# Patient Record
Sex: FEMALE | Race: WHITE
[De-identification: names, ages, dates, MRNs, and addresses within clinical notes are randomized per-mention and may not be internally consistent; named-entity substitution may affect disease eponyms.]

---

## 2019-07-09 NOTE — HP
*** AMENDED REPORT NOW INCLUDES DESIGNATED COSIGNER ***



HISTORY AND PHYSICAL:

 

DATE OF ADMISSION/SURGERY:  07/16/19

 

DATE OF OFFICE VISIT:  07/08/19

 

SURGEON:  Eve Gifford MD * (DICTATED BY YARITZA MURILLO)

 

PROCEDURE:  Left total knee arthroplasty.

 

CHIEF COMPLAINT:  Left knee pain.

 

HISTORY OF PRESENT ILLNESS:  Ms. Garnett is a 68-year-old female with end-stage 
osteoarthritis of the left knee.  She has failed conservative treatment and 
elected to proceed with the left total knee arthroplasty.

 

PAST MEDICAL HISTORY:  Hypertension, high cholesterol, sleep apnea, 
hypothyroidism.

 

PAST SURGICAL HISTORY:  Vein stripping, hysterectomy, partial thyroidectomy, D 
and C, and sclerotherapy.

 

CURRENT MEDICATIONS:

1.  Tylenol as needed.

2.  Amlodipine 10 mg daily.

3.  Atorvastatin 20 mg daily.

4.  Biotin 5 mg daily.

5.  Chlorthalidone 25 mg daily.

6.  Loratadine 10 mg daily.

7.  Losartan 100 mg daily.

8.  Metronidazole topical cream as needed.

9.  Mometasone nasal spray at night.

10.  Multivitamin daily.

11.  Potassium daily.

12.  Proctosol as needed.

13.  Senna as needed.

14.  Turmeric.

15.  VESIcare.

16.  CPAP machine.

 

ALLERGIES:  No known drug allergies.

 

FAMILY HISTORY:  Cancer and coronary artery disease.

 

SOCIAL HISTORY:  She is a 68-year-old female.  She lives alone.  She does not 
smoke or use drugs.  She uses alcohol rarely.

 

REVIEW OF SYSTEMS:  A complete 14-point review of systems was reviewed with the 
patient.  It was positive for hypothyroidism.  She denies history of DVT, PE, 
hepatitis, HIV, or anesthesia problems.

 

                               PHYSICAL EXAMINATION

 

GENERAL:  She is well developed, well nourished, in no acute distress.

 

VITAL SIGNS:  She stands 5 feet 6 inches tall, weighs 259 pounds.  Her blood 
pressure is 118/64, heart rate is 80.

 

HEENT:  Normocephalic, atraumatic.

 

NECK:  Supple.  No palpable lymph nodes.

 

PULMONARY:  Lungs are clear to auscultation bilaterally.

 

CARDIO:  Regular rate and rhythm.  Strong S1, S2.

 

ABDOMEN:  Soft, nontender, and nondistended.

 

NEUROLOGICAL:  She is alert and oriented x3.

 

MUSCULOSKELETAL:  Left lower extremity:  The skin is intact.  There are no open 
wounds or abrasions.  There is a moderate to large effusion of the left knee. 
There is a varus deformity.  Range of motion is 10 to 120 degrees of flexion. 
Positive Apley's.  Positive Ari's.  Significant patellofemoral crepitus 
with range of motion.  She is able to dorsiflex and plantar flex and has a 2+ 
dorsalis pedis pulse.

 

ASSESSMENT AND PLAN:  Ms. Garnett is a 68-year-old female with end-stage 
osteoarthritis of the left knee.  She has failed conservative treatment and 
elected to proceed with a left total knee arthroplasty.  The surgery is 
scheduled for 

07/16/19 with Dr. Gifford. Dr. Gifford discussed the risks and benefits of the 
surgery at today's visit and all of her questions were answered.  She will 
follow up with Dr. Gifford 2 weeks after the surgery.

 

____________________________________ YARITZA MURILLO

 

[id number]/[facility] #: [id number]

YURIY

## 2019-07-16 ENCOUNTER — HOSPITAL ENCOUNTER (INPATIENT)
Dept: HOSPITAL 25 - AA | Age: 69
LOS: 2 days | Discharge: NURSING, REHAB OR LONG-TERM CARE | DRG: 470 | End: 2019-07-18
Attending: ORTHOPAEDIC SURGERY | Admitting: ORTHOPAEDIC SURGERY
Payer: MEDICARE

## 2019-07-16 DIAGNOSIS — G47.33: ICD-10-CM

## 2019-07-16 DIAGNOSIS — E87.6: ICD-10-CM

## 2019-07-16 DIAGNOSIS — I11.9: ICD-10-CM

## 2019-07-16 DIAGNOSIS — Z72.89: ICD-10-CM

## 2019-07-16 DIAGNOSIS — R11.0: ICD-10-CM

## 2019-07-16 DIAGNOSIS — I44.7: ICD-10-CM

## 2019-07-16 DIAGNOSIS — K57.50: ICD-10-CM

## 2019-07-16 DIAGNOSIS — E66.9: ICD-10-CM

## 2019-07-16 DIAGNOSIS — M21.162: ICD-10-CM

## 2019-07-16 DIAGNOSIS — Z87.891: ICD-10-CM

## 2019-07-16 DIAGNOSIS — I49.3: ICD-10-CM

## 2019-07-16 DIAGNOSIS — E78.5: ICD-10-CM

## 2019-07-16 DIAGNOSIS — Z80.1: ICD-10-CM

## 2019-07-16 DIAGNOSIS — M17.0: Primary | ICD-10-CM

## 2019-07-16 DIAGNOSIS — Z83.49: ICD-10-CM

## 2019-07-16 DIAGNOSIS — E78.00: ICD-10-CM

## 2019-07-16 DIAGNOSIS — E89.0: ICD-10-CM

## 2019-07-16 DIAGNOSIS — L84: ICD-10-CM

## 2019-07-16 DIAGNOSIS — Z90.710: ICD-10-CM

## 2019-07-16 DIAGNOSIS — M25.462: ICD-10-CM

## 2019-07-16 DIAGNOSIS — M25.762: ICD-10-CM

## 2019-07-16 PROCEDURE — 36415 COLL VENOUS BLD VENIPUNCTURE: CPT

## 2019-07-16 PROCEDURE — 85049 AUTOMATED PLATELET COUNT: CPT

## 2019-07-16 PROCEDURE — C1776 JOINT DEVICE (IMPLANTABLE): HCPCS

## 2019-07-16 PROCEDURE — 0SRD0J9 REPLACEMENT OF LEFT KNEE JOINT WITH SYNTHETIC SUBSTITUTE, CEMENTED, OPEN APPROACH: ICD-10-PCS | Performed by: ORTHOPAEDIC SURGERY

## 2019-07-16 PROCEDURE — 80048 BASIC METABOLIC PNL TOTAL CA: CPT

## 2019-07-16 PROCEDURE — 85014 HEMATOCRIT: CPT

## 2019-07-16 PROCEDURE — 85018 HEMOGLOBIN: CPT

## 2019-07-16 PROCEDURE — 88305 TISSUE EXAM BY PATHOLOGIST: CPT

## 2019-07-16 PROCEDURE — 83735 ASSAY OF MAGNESIUM: CPT

## 2019-07-16 PROCEDURE — 88311 DECALCIFY TISSUE: CPT

## 2019-07-16 RX ADMIN — SODIUM CHLORIDE, SODIUM LACTATE, POTASSIUM CHLORIDE, AND CALCIUM CHLORIDE SCH MLS/HR: 600; 310; 30; 20 INJECTION, SOLUTION INTRAVENOUS at 16:23

## 2019-07-16 RX ADMIN — FLUTICASONE PROPIONATE SCH SPRAY: 50 SPRAY, METERED NASAL at 22:36

## 2019-07-16 RX ADMIN — OXYCODONE HYDROCHLORIDE PRN MG: 5 CAPSULE ORAL at 18:23

## 2019-07-16 RX ADMIN — OXYCODONE HYDROCHLORIDE PRN MG: 5 CAPSULE ORAL at 22:36

## 2019-07-16 RX ADMIN — MAGNESIUM HYDROXIDE SCH ML: 400 SUSPENSION ORAL at 22:36

## 2019-07-16 RX ADMIN — OXYCODONE HYDROCHLORIDE AND ACETAMINOPHEN PRN TAB: 5; 325 TABLET ORAL at 16:41

## 2019-07-16 RX ADMIN — CEFAZOLIN SCH MLS/HR: 1 INJECTION, POWDER, FOR SOLUTION INTRAVENOUS at 20:14

## 2019-07-16 RX ADMIN — DOCUSATE SODIUM SCH MG: 100 CAPSULE, LIQUID FILLED ORAL at 22:36

## 2019-07-16 NOTE — XMS REPORT
Continuity of Care Document (CCD)

 Created on:2019



Patient:Caryn Garcia

Sex:Female

:1950

External Reference #:[id number]





Demographics







 Address  [address] [place] [address]

 

 Home Phone  [phone]

 

 Mobile Phone  [phone]

 

 Email Address  [email or web address]

 

 Preferred Language  en

 

 Marital Status  Not  or 

 

 [employer] Affiliation  Unknown

 

 Race  White

 

 Ethnic Group  Not  or 









Author







 Name  Luma Garcia









Support







 Name  Relationship  Address  Phone

 

 Dorothy Richards  Unavailable  Unavailable  [phone]









Care Team Providers







 Name  Role  Phone

 

 Josesito Rubin DC  Care Team Information   Unavailable

 

 Juanita Smith MD  Primary Care Physician  Unavailable









Payers







 Date  Identification Numbers  Payment Provider  Subscriber

 

 Effective: 2014  Policy Number: [id number]  Medicare  Caryn Garcia









 PayID: [id number]  [address]









 [place] [address]









   Policy Number: [id number]  [facility]  Caryn Garcia









 Group Number: [id number]  [address] 

 

 PayID: [id number]  [place] [address]









 Effective: 2014  Policy Number: [id number]  [facility]  Chinmay Garcia 
III









 Expires: 2018  PayID: [id number]  [address] [address]









 [place] [address]







Problems







 Active Problems  Provider  Date

 

 Obstructive sleep apnea syndrome  Grace Johnston DNP, RN, FNP-BC  Onset: 2011

 

 Localized, primary osteoarthritis  Eve Gifford M.D.  Onset: 2017

 

 Heart sounds abnormal  Grace Johnston DNP, RN, FNP-BC  Onset: 2018







Family History







 Date  Family Member(s)  Observation  Comments

 

   General  Cancer  







Social History







 Type  Date  Description  Comments

 

 Birth Sex    Unknown  

 

 Lives With      

 

 Occupation    Retired  

 

 ETOH Use    Occasionally consumes alcohol  

 

 Tobacco Use  Start: Unknown  Patient has never smoked  

 

 Smoking Status  Reviewed: 19  Patient has never smoked  

 

 Exercise Type/Frequency    Exercises regularly  







Allergies, Adverse Reactions, Alerts







 Description

 

 No Known Drug Allergies







Medications







 Active Medications  SIG  Qnty  Indications  Ordering  Date



         Provider  

 

 Cephalexin  1 tablet every 8  30caps    Eve Gifford,  2018



        500mg Capsules  hours x 10 days      M.D.  



           

 

 Metoprolol Succinate ER  one by mouth      Unknown  



   daily        



 50mg          

 

 Chlorthalidone  one by mouth      Unknown  



            25mg  daily        



           

 

 Vesicare  one by mouth      Unknown  



      10mg  daily        



           

 

 Atorvastatin Calcium  one by mouth      Unknown  



                  20mg  daily        



           

 

 Losartan Potassium  one by mouth      Unknown  



                100mg  daily        



           

 

 Nasonex  as needed      Unknown  



     50mcg          



           

 

 Proctosol HC  as needed      Unknown  



          2.5%          



           

 

 Omega 3 500        Unknown  



         500mg Capsules          



           

 

 Vitamin D  by mouth      Unknown  



       1000Unit Tablets  everyday        



           

 

 Biotin  take one      Unknown  



   capsule/tablet        



   daily by mouth        

 

 Potassium  1 by mouth every      Unknown  



       75mg Tablets  day        



           

 

 Atorvastatin Calcium  Take 1 Tablet By      Unknown  



                  20mg  Mouth Every Day        



 Tablets          



           







Medications Administered in Office







 Medication  SIG  Qnty  Indications  Ordering Provider  Date

 

 Depomedrol 40MG        Eve Gifford M.D.  2018



         Injection          



           

 

 Depomedrol 40MG        Eve Gifford M.D.  05/15/2017



         Injection          



           

 

 Technetium TC 99M        [facility]  05/15/2017



 Tetrofosmin, Per Unit Dose          



 Up To 40 Millicuries          



              Injection          



           

 

 Inj, Regadenoson, 0.1 MG        Red Santa M.D.,  2017



                  Injection        FACC, FASNC  



           

 

 Technetium TC 99M        Red Santa M.D.,  2017



 Tetrofosmin, Per Unit Dose        FACC, FASNC  



 Up To 40 Millicuries          



              Injection          



           







Vital Signs







 Date  Vital  Result  Comment

 

 2019 10:11am  Height  66 inches  5'6"









 Weight  260.00 lb  

 

 Heart Rate  81 /min  

 

 O2 % BldC Oximetry  97 %  

 

 BMI (Body Mass Index)  42.0 kg/m2  









 2018  2:28pm  Height  66 inches  5'6"









 Weight  252.00 lb  

 

 BP Systolic  126 mmHg  

 

 BP Diastolic  71 mmHg  

 

 Respiratory Rate  16 /min  

 

 Pain Level  2  

 

 BMI (Body Mass Index)  40.7 kg/m2  









 2018  1:09pm  Height  66 inches  5'6"









 Weight  252.00 lb  

 

 Heart Rate  60 /min  

 

 BP Systolic Sitting  138 mmHg  

 

 BP Diastolic Sitting  82 mmHg  

 

 Respiratory Rate  14 /min  

 

 O2 % BldC Oximetry  97 %  

 

 BMI (Body Mass Index)  40.7 kg/m2  









 2017  9:44am  Height  66 inches  5'6"









 Weight  256.00 lb  

 

 Heart Rate  70 /min  

 

 BP Systolic Sitting  138 mmHg  

 

 BP Diastolic Sitting  74 mmHg  

 

 Respiratory Rate  28 /min  

 

 O2 % BldC Oximetry  96 %  room air

 

 BMI (Body Mass Index)  41.3 kg/m2  









 05/15/2017 10:42am  Height  66 inches  5'6"









 Weight  255.00 lb  

 

 Heart Rate  76 /min  

 

 BP Systolic  154 mmHg  

 

 BP Diastolic  71 mmHg  

 

 Pain Level  2  

 

 BMI (Body Mass Index)  41.2 kg/m2  









 2017 10:15am  Height  66 inches  5'6"









 Weight  255.00 lb  

 

 Heart Rate  90 /min  

 

 BP Systolic  170 mmHg  

 

 BP Diastolic  80 mmHg  

 

 Body Temperature  97.3 F  

 

 BMI (Body Mass Index)  41.2 kg/m2  









 2016 11:38am  Height  66 inches  5'6"









 Weight  245.00 lb  

 

 Heart Rate  82 /min  

 

 BP Systolic  148 mmHg  

 

 BP Diastolic  70 mmHg  

 

 Respiratory Rate  14 /min  

 

 O2 % BldC Oximetry  96 %  

 

 BMI (Body Mass Index)  39.5 kg/m2  









 2014  3:13pm  Height  66 inches  5'6"









 Weight  260.00 lb  

 

 Heart Rate  77 /min  

 

 BP Systolic Sitting  140 mmHg  

 

 BP Diastolic Sitting  66 mmHg  

 

 Respiratory Rate  18 /min  

 

 O2 % BldC Oximetry  97 %  

 

 BMI (Body Mass Index)  42.0 kg/m2  









 2014 10:38am  Height  66 inches  5'6"









 Weight  252.00 lb  

 

 Heart Rate  78 /min  

 

 BP Systolic  164 mmHg  

 

 BP Diastolic  98 mmHg  

 

 BMI (Body Mass Index)  40.7 kg/m2  







Results







 Test  Date  Facility  Test  Result  H/L  Range  Note

 

 Laboratory test  2017  [facility]  Cytology  SEE RESULT      1



 finding    [address]  Non-Gyn  BELOW      



     [place] [id number] [phone]          









 1  SEE RESULT BELOW



   -----------------------------------------------------------------------------
---------------



   Name:  CARYN GARCIA                  : 1950    Attend Dr: Richi Valencia MD



   Acct:  [id number]  Unit: [id number]  AGE: 67            Location:  THYROID



   Re17                        SEX: F             Status:    REG REF



   -----------------------------------------------------------------------------
---------------



   



   SPEC: [id number]            LORI: 17-      SUBM DR: Richi Valencia MD



   REQ:  [id number]             RECD: 17-



   STATUS: AZRA HARKINS DR: Juanita Goodrich MD



   _



   ORDERED:  FNA-IMG GUID BX, CYTO ADEQ-1ST P



   



   FINAL DIAGNOSIS



   



   



   



   Thyroid, left lower, Ultrasound guided, fine needle



   aspiration:



   --Benign thyroid nodule- involutional type (Arden Class



   II).



   



   



   



   



   



   



   The specimen demonstrates moderate watery proteinaceous fluid,



   a moderate amount of benign appearing follicular epithelium



   arranged in uniform sheets, medium sized follicles and only



   occasional small groups.  Abundant pigmented and non-pigmented



   macrophages are seen in the background.  No features of



   papillary carcinoma are seen.  In this clinical setting the



   risk of malignancy is less than 3%.  Clinical management of



   this thyroid nodule should be based on clinical and



   radiographic features as well as the above findings.



   THYROID LEFT - US GUIDED FINE NEEDLE ASPIRATION LEFT LOWER THYROID NODULE



   



   CLINICAL HISTORY



   



   Left lower thyroid nodule 5.3 x 3.9 x 4.4cm



   



   IMMEDIATE INTERPRETATION



   



   Pass 1-adequate



   



   



   



   



   ** CONTINUED ON NEXT PAGE **



   



   * ML=Testing performed at Main Lab



   DEPARTMENT OF PATHOLOGY,  [address] [place] [address]



   Phone # [phone]      Fax #[phone]



   Olu Quigley M.D. Director     [place] # [id number]



   



   



   



   RUN DATE: 17               [facility] LAB **LIVE**         
       PAGE    2



   



   -----------------------------------------------------------------------------
---------------



   Patient: CARYN GARCIA                   [id number]     (Continued)



   -----------------------------------------------------------------------------
---------------



   



   GROSS DESCRIPTION          (Continued)



   



   



   GROSS DESCRIPTION



   



   2- alcohol fixed slide(s)



   1- passes



   



   Signed __________(signature on file)___________ Olu Quigley MD  [id number]



   



   -----------------------------------------------------------------------------
---------------



   



   



   



   



   



   



   



   



   



   



   



   



   



   



   



   



   



   



   



   



   



   



   



   



   



   



   



   



   



   



   



   



   



   



   ** END OF REPORT **



   



   * ML=Testing performed at Main Lab



   DEPARTMENT OF PATHOLOGY,  [address] [place] [address]



   Phone # [phone]      Fax #[phone]



   Olu Quigley M.D. Director     [facility] # [id number]







Procedures







 Date  Code  Description  Status

 

 2018  Inject/Drain Joint/Bursa Major W/O US  Completed

 

 05/15/2017  47013  Inject/Drain Joint/Bursa Major W/O US  Completed

 

 2017  53270  Stress Test  Completed

 

 2017  12401  Myocardial Perfusion Imaging Tomographic (Spect) Multiple  
Completed



     Studies  







Encounters







 Type  Date  Location  Provider  Dx  Diagnosis

 

 Office Visit  2019  Orthopedic  Eve Gifford,  M25.562  Pain in left knee



   9:45a  [facility]    









 M25.462  Effusion, left knee

 

 M17.12  Unilateral primary osteoarthritis, left knee









 Office Visit  2018  2:15p  Orthopedic Services  Eve Gifford,  M25.562  
Pain in left



     Of [facility]  MKleberD.    knee









 M25.462  Effusion, left knee

 

 M17.12  Unilateral primary osteoarthritis, left knee









 Office Visit  2018  Pulmonology And  Grace  G47.33  Obstructive sleep



   1:15p  Sleep [facility]  GAGE Johnston RN,    apnea (adult)



     Issa VARGHESE-BC    (pediatric)









 R00.8  Other abnormalities of heart beat









 Office Visit  2017  Pulmonology And  Grace  G47.33  Obstructive sleep



   9:30a  [facility]  GAGE Johnston RN,    apnea (adult)



     Issa VARGHESE-BC    (pediatric)

 

 Office Visit  2017  Orthopedic  Eve Gifford,  M25.462  Effusion, left



   10:00a  Services Of  M.D.    knee



     [facility]      









 M25.562  Pain in left knee

 

 M17.12  Unilateral primary osteoarthritis, left knee









 Office Visit  2016  Pulmonology And  Grace  G47.33  Obstructive sleep



   11:15a  [facility]  GAGE Johnston    apnea (adult)



     ABIMAEL Parsons RN-BC    (pediatric)

 

 Office Visit  2014  Pulmonology And  Grace  327.23  Obstructive Sleep



   3:00p  [facility]  GAGE Johnston    Apnea Adult &



     Issa MERCHANT, FNP-BC    Pediatric

 

 Office Visit  2014  Orthopedic  Eleno  715.36  Osteoarthrosis



   10:00a  Services Of  TOM Palencia Not Spec



     [facility]      Prime Or 2Ndy Lower



           Leg







Plan of Treatment

Future Appointment(s):2020  9:20 am - Perfecto Hendrix MD at [facility] 
Nvdjchgvtlv96/08/2019  9:30 am - Eve Gifford M.D. at [facility]2019  9:30 am - Eve Gifford M.D. at [facility]2019 - Eve Gifford M.D.M25.562 Pain in left kneeFollow up:Follow 
up:   7-10 days before mvlyqyjO56.462 Effusion, left kneeM17.12 Unilateral 
primary osteoarthritis, left knee

## 2019-07-16 NOTE — XMS REPORT
Continuity of Care Document (CCD)

 Created on:2019



Patient:Caryn Garcia

Sex:Female

:1950

External Reference #:[id number]





Demographics







 Address  [address] [place] [address]

 

 Home Phone  [phone]

 

 Mobile Phone  [phone]

 

 Email Address  [email or web address]

 

 Preferred Language  en

 

 Marital Status  Not  or 

 

 Cheondoism Affiliation  Unknown

 

 Race  White

 

 Ethnic Group  Not  or 









Author







 Name  Luzmaria Rodriguez









Support







 Name  Relationship  Address  Phone

 

 Dorothy Richards  Unavailable  Unavailable  [phone]









Care Team Providers







 Name  Role  Phone

 

 Josesito Rubin DC  Care Team Information   Unavailable

 

 Juanita Smith MD  Primary Care Physician  Unavailable









Payers







 Date  Identification Numbers  Payment Provider  Subscriber

 

 Effective: 2014  Policy Number: [id number]  Medicare  Caryn Garcia









 PayID: [id number]  [address]









 [place] [address]









   Policy Number: [id number]  [facility]  Caryn Garcia









 Group Number: [id number]  [address] 

 

 PayID: [id number]  [place] [address]









 Effective: 2014  Policy Number: [id number]   Facets  Chinmay Garcia 
III









 Expires: 2018  PayID: [id number]  [address] [address]









 [place] [address]







Problems







 Active Problems  Provider  Date

 

 Obstructive sleep apnea syndrome  Grace Johnston DNP, RN, FNP-BC  Onset: 2011

 

 Localized, primary osteoarthritis  Eve Gifford M.D.  Onset: 2017

 

 Heart sounds abnormal  Grace Johnston DNP, RN, FNP-BC  Onset: 2018







Family History







 Date  Family Member(s)  Observation  Comments

 

   General  Cancer  

 

   General  Hypertension  

 

 Onset:  (age  Father  Lung Cancer  HEAVY SMOKER 



 72 Years)      ASTHMA ALLERGIES ECZEMA

 

   Mother  Hypertension  CAD  MOTHER  AGE 93



       CHF

 

 Onset:  (age  Mother  Congestive Heart Failure (CHF)  



 93 Years)      

 

 Onset:  (age  Mother  Thyroid Disease  THYROID CANCER



 84 Years)      

 

   Mother  Hypercholesterolemia  HYPERLIPIDEMIA

 

   Siblings  1  Sister - congenital



       heart defect hole in



       heart







Social History







 Type  Date  Description  Comments

 

 Birth Sex    Unknown  

 

 Marital Status      

 

 Lives With    Alone  

 

 Occupation    Retired  

 

 ETOH Use    Occasionally consumes  1 glass of good red



     alcohol  wine

 

 Tobacco Use  Start: Unknown  Patient has never  



     smoked  

 

 Recreational Drug Use    Denies Drug Use  

 

 Smoking Status  Reviewed: 19  Patient has never  



     smoked  

 

 Exercise Type/Frequency    Exercises regularly  physical therapy desk



       stationary bike goes



       to [email or web address]







Allergies, Adverse Reactions, Alerts







 Description

 

 No Known Drug Allergies







Medications







 Active Medications  SIG  Qnty  Indications  Ordering  Date



         Provider  

 

 Amlodipine Besylate  1 by mouth every  30tabs  I10  Amado MA  2019



                   5mg  day      Choudhury, DO FACC  



 Tablets          



           

 

 Metoprolol Succinate  1 by mouth every  7tabs    Amado S.  2019



 ER  day      Choudhury, DO FACC  



  25mg Tablets ER 24HR          



           

 

 Tumeric  99 mg  1 tab po qd      Unknown  

 

 Loratadine  once a day for      Unknown  



          10mg  allergies as needed        



 Capsules          



           

 

 Acetaminophen  2 every 6 hours as      Unknown  



             500mg  needed        



 Tablets          



           

 

 Mometasone Furoate  spray two sprays in      Unknown  



   each nostril twice        



 50mcg/Act Suspension  daily        



           

 

 Metrocream  apply twice a day -      Unknown  



          0.75% Cream  thin film as needed        



           

 

 Senna S  take 2 t d at hs      Unknown  



       8.6-50mg          



 Tablets          



           

 

 Cpap  for use while      Unknown  



     Device  sleeping 10 cm        



           

 

 Ec-81 Aspirin  take 1 t po d last      Unknown  



             81mg  took in feb because        



 Tablets DR  of hysterectomy        



   never told to        



   resume        

 

 Atorvastatin Calcium  Take 1 Tablet By      Unknown  



   Mouth Every Day        



 20mg Tablets          



           

 

 Potassium  1 by mouth every      Unknown  



         75mg Tablets  day        



           

 

 Biotin  take one      Unknown  



      5mg Capsules  capsule/tablet        



   daily at lunch        

 

 Proctosol HC  as needed      Unknown  



            2.5%          



           

 

 Losartan Potassium  one by mouth daily      Unknown  



           



 100mg          



           

 

 Vesicare  one by mouth daily      Unknown  



        10mg          



           

 

 Chlorthalidone  one by mouth daily      Unknown  



              25mg          



           









 History Medications









 Cephalexin  1 tablet every 8  30caps    Eve Gifford,  2018 -



         500mg Capsules  hours x 10 days      M.D.  2019



           

 

 Metoprolol Succinate  one by mouth daily      Unknown   -



 ER          2019



 50mg          

 

 Atorvastatin Calcium  one by mouth daily      Unknown   -



                   20mg          2019



           

 

 Nasonex  as needed      Unknown   -



      50mcg          2019



           

 

 Omega 3 500  i      Unknown   -



          500mg          2019



 Capsules          



           

 

 Vitamin D  by mouth everyday      Unknown   -



        1000Unit          2019



 Tablets          



           

 

 Vitamin K  tk 3 t d      Unknown   -



 (Phytonadione)          2019



             100mcg          



 Tablets          



           







Medications Administered in Office







 Medication  SIG  Qnty  Indications  Ordering Provider  Date

 

 Depomedrol 40MG        Eve Gifford M.D.  2018



         Injection          



           

 

 Depomedrol 40MG        Eve Gifford M.D.  05/15/2017



         Injection          



           

 

 Technetium TC 99M        [facility]  05/15/2017



 Tetrofosmin, Per Unit Dose          



 Up To 40 Millicuries          



              Injection          



           

 

 Inj, Regadenoson, 0.1 MG        Red Santa M.D.,  2017



                  Injection        FACC, FASNC  



           

 

 Technetium TC 99M        Red Santa M.D.,  2017



 Tetrofosmin, Per Unit Dose        FACC, FASNC  



 Up To 40 Millicuries          



              Injection          



           







Vital Signs







 Date  Vital  Result  Comment

 

 2019  8:24am  Height  66 inches  5'6"









 Weight  257.00 lb  CLothes/shoes

 

 Heart Rate  82 /min  Radial

 

 BP Systolic Sitting  162 mmHg  Rue Lg cuff

 

 BP Diastolic Sitting  90 mmHg  Rue Lg cuff

 

 BP Systolic Standing  158 mmHg  Lue Lg cuff

 

 BP Diastolic Standing  90 mmHg  Lue Lg cuff

 

 BMI (Body Mass Index)  41.5 kg/m2  









 2019 10:11am  Height  66 inches  5'6"









 Weight  260.00 lb  

 

 Heart Rate  81 /min  

 

 O2 % BldC Oximetry  97 %  

 

 BMI (Body Mass Index)  42.0 kg/m2  









 2018  2:28pm  Height  66 inches  5'6"









 Weight  252.00 lb  

 

 BP Systolic  126 mmHg  

 

 BP Diastolic  71 mmHg  

 

 Respiratory Rate  16 /min  

 

 Pain Level  2  

 

 BMI (Body Mass Index)  40.7 kg/m2  









 2018  1:09pm  Height  66 inches  5'6"









 Weight  252.00 lb  

 

 Heart Rate  60 /min  

 

 BP Systolic Sitting  138 mmHg  

 

 BP Diastolic Sitting  82 mmHg  

 

 Respiratory Rate  14 /min  

 

 O2 % BldC Oximetry  97 %  

 

 BMI (Body Mass Index)  40.7 kg/m2  









 2017  9:44am  Height  66 inches  5'6"









 Weight  256.00 lb  

 

 Heart Rate  70 /min  

 

 BP Systolic Sitting  138 mmHg  

 

 BP Diastolic Sitting  74 mmHg  

 

 Respiratory Rate  28 /min  

 

 O2 % BldC Oximetry  96 %  room air

 

 BMI (Body Mass Index)  41.3 kg/m2  









 05/15/2017 10:42am  Height  66 inches  5'6"









 Weight  255.00 lb  

 

 Heart Rate  76 /min  

 

 BP Systolic  154 mmHg  

 

 BP Diastolic  71 mmHg  

 

 Pain Level  2  

 

 BMI (Body Mass Index)  41.2 kg/m2  









 2017 10:15am  Height  66 inches  5'6"









 Weight  255.00 lb  

 

 Heart Rate  90 /min  

 

 BP Systolic  170 mmHg  

 

 BP Diastolic  80 mmHg  

 

 Body Temperature  97.3 F  

 

 BMI (Body Mass Index)  41.2 kg/m2  









 2016 11:38am  Height  66 inches  5'6"









 Weight  245.00 lb  

 

 Heart Rate  82 /min  

 

 BP Systolic  148 mmHg  

 

 BP Diastolic  70 mmHg  

 

 Respiratory Rate  14 /min  

 

 O2 % BldC Oximetry  96 %  

 

 BMI (Body Mass Index)  39.5 kg/m2  









 2014  3:13pm  Height  66 inches  5'6"









 Weight  260.00 lb  

 

 Heart Rate  77 /min  

 

 BP Systolic Sitting  140 mmHg  

 

 BP Diastolic Sitting  66 mmHg  

 

 Respiratory Rate  18 /min  

 

 O2 % BldC Oximetry  97 %  

 

 BMI (Body Mass Index)  42.0 kg/m2  









 2014 10:38am  Height  66 inches  5'6"









 Weight  252.00 lb  

 

 Heart Rate  78 /min  

 

 BP Systolic  164 mmHg  

 

 BP Diastolic  98 mmHg  

 

 BMI (Body Mass Index)  40.7 kg/m2  







Results







 Test  Date  Facility  Test  Result  H/L  Range  Note

 

 Laboratory test  2019  [facility]  B-Type  <pending>      



 finding    [address]  Natriuretic        



     [place] [address]  Peptide BNP        



     [phone]          









 Potassium  <pending>      

 

 Magnesium  <pending>      









 Laboratory test  2017  [facility]  Cytology  SEE RESULT      1



 finding    [address]  Non-Gyn  BELOW      



     [place], [place] [id number] [phone]          









 1  SEE RESULT BELOW



   -----------------------------------------------------------------------------
---------------



   Name:  CARYN GARCIA                  : 1950    Attend Dr: Richi Valencia MD



   Acct:  [id number]  Unit: [id number]  AGE: 67            Location:  THYROID



   Re17                        SEX: F             Status:    REG REF



   -----------------------------------------------------------------------------
---------------



   



   SPEC: [id number]            LORI: 17-0      SUBM DR: Richi Valencia MD



   REQ:  [id number]             RECD: 



   STATUS: AZRA HARKINS DR: Juanita Goodrich MD



   _



   ORDERED:  FNA-IMG GUID BX, CYTO ADEQ-1ST P



   



   FINAL DIAGNOSIS



   



   



   



   Thyroid, left lower, Ultrasound guided, fine needle



   aspiration:



   --Benign thyroid nodule- involutional type (Luke Class



   II).



   



   



   



   



   



   



   The specimen demonstrates moderate watery proteinaceous fluid,



   a moderate amount of benign appearing follicular epithelium



   arranged in uniform sheets, medium sized follicles and only



   occasional small groups.  Abundant pigmented and non-pigmented



   macrophages are seen in the background.  No features of



   papillary carcinoma are seen.  In this clinical setting the



   risk of malignancy is less than 3%.  Clinical management of



   this thyroid nodule should be based on clinical and



   radiographic features as well as the above findings.



   THYROID LEFT - US GUIDED FINE NEEDLE ASPIRATION LEFT LOWER THYROID NODULE



   



   CLINICAL HISTORY



   



   Left lower thyroid nodule 5.3 x 3.9 x 4.4cm



   



   IMMEDIATE INTERPRETATION



   



   Pass 1-adequate



   



   



   



   



   ** CONTINUED ON NEXT PAGE **



   



   * ML=Testing performed at Main Lab



   DEPARTMENT OF PATHOLOGY,  [address] [place] [address]



   Phone # [phone]      Fax #[phone]



   Olu Quigley M.D. Director     [facility] # [id number]



   



   



   



   RUN DATE: 17               [facility] LAB **LIVE**         
       PAGE    2



   



   -----------------------------------------------------------------------------
---------------



   Patient: CARYN GARCIA                   [id number]     (Continued)



   -----------------------------------------------------------------------------
---------------



   



   GROSS DESCRIPTION          (Continued)



   



   



   GROSS DESCRIPTION



   



   2- alcohol fixed slide(s)



   1- passes



   



   Signed __________(signature on file)___________ Olu Quigley MD  [id number]



   



   -----------------------------------------------------------------------------
---------------



   



   



   



   



   



   



   



   



   



   



   



   



   



   



   



   



   



   



   



   



   



   



   



   



   



   



   



   



   



   



   



   



   



   



   ** END OF REPORT **



   



   * ML=Testing performed at Main Lab



   DEPARTMENT OF PATHOLOGY,  [address] [place] [address]



   Phone # [phone]      Fax #[phone]



   Olu Quigley M.D. Director     [facility] # [id number]







Procedures







 Date  Code  Description  Status

 

 2019  63415  EKG Tracing & Interpretation  Completed

 

 2018  Inject/Drain Joint/Bursa Major W/O US  Completed

 

 05/15/2017  43968  Inject/Drain Joint/Bursa Major W/O US  Completed

 

 2017  52296  Stress Test  Completed

 

 2017  01633  Myocardial Perfusion Imaging Tomographic (Spect) Multiple  
Completed



     Studies  







Encounters







 Type  Date  Location  Provider  Dx  Diagnosis

 

 Office Visit  2019  Orthopedic  Eve Gifford,  M25.562  Pain in left knee



   9:45a  Services Of C.M.MARCELINA.  M.D.    









 M25.462  Effusion, left knee

 

 M17.12  Unilateral primary osteoarthritis, left knee









 Office Visit  2018  2:15p  Orthopedic Services  Eve Gifford,  M25.562  
Pain in left



     Of C.M.A.  M.D.    knee









 M25.462  Effusion, left knee

 

 M17.12  Unilateral primary osteoarthritis, left knee









 Office Visit  2018  Pulmonology And  Grace  G47.33  Obstructive sleep



   1:15p  Sleep Services Of  GAGE Johnston RN,    apnea (adult)



     Issa VARGHESE-BC    (pediatric)









 R00.8  Other abnormalities of heart beat









 Office Visit  2017  Pulmonology And  Grace  G47.33  Obstructive sleep



   9:30a  Sleep Services Of  GAGE Johnston RN,    apnea (adult)



     Issa VARGHESE-BC    (pediatric)

 

 Office Visit  2017  Orthopedic  Eve Gifford,  M25.462  Effusion, left



   10:00a  Services Of  M.D.    knee



     C.M.AKleber      









 M25.562  Pain in left knee

 

 M17.12  Unilateral primary osteoarthritis, left knee









 Office Visit  2016  Pulmonology And  Grace  G47.33  Obstructive sleep



   11:15a  Sleep Services Of  GAGE Johnston    apnea (adult)



     Issa  RN, FNANNIA-BC    (pediatric)

 

 Office Visit  2014  Pulmonology And  Grace  327.23  Obstructive Sleep



   3:00p  Sleep Services Of  GAGE Johnston    Apnea Adult &



     [facility]  RN, FNP-BC    Pediatric

 

 Office Visit  2014  Orthopedic  Eleno  715.36  Osteoarthrosis



   10:00a  Services Of  TOM Palencia Not Spec



     C.M.A.      Prime Or 2Ndy Lower



           Leg







Plan of Treatment

Future Appointment(s):2019  1:30 pm - [facility] Schedule at [facility]2019  8:45 am - Amado Choudhury DO FACC at [facility]2019 11:30 am - Tad Rosano, PA-C at [facility] 
[facility].2019 11:30 am - LANETTE Myers at [facility] 
[facility].2019 11:30 am - YARITZA Hernandez at [facility] 
[facility].2020  9:20 am - Perfecto Hendrix MD at [facility] Ylqrdoetgof29/08/2019  9:
30 am - Eve Gifford M.D. at [facility].2019 11:30 am 
- Eve Gifford M.D. at [facility]2019 - Amado Choudhury DO FACCI44.7 Left bundle-branch block, unspecifiedNew Orders:Stress Test
, Pharmacologic Nuclear (Lexiscan), Ordered: 19Comments:Right now you are 
in and out of a left bundle branch block.The metoprolol slows electrical 
conduction and increases the probability and time you are in this conduction.We 
will try to use something elsefor blood pressure to slow the progression of 
this.Start taking 5 mg of amlodipine once a day.Take 25 mg of metoprolol (I 
sent a new prescription to your pharmacy) for 1 week and then discontinue 
metoprolol completely.  If your blood pressure stays elevated, we can increase 
the amlodipine to maximum dose 10 mg.Follow up:Please schedule echo at [facility] (may 
need definity)  f/u after testing and repeat EKG that yrsivO92.02 Shortness of 
breathNew Orders:Echocardiogram, Ordered: 19E87.6 DdjpmkrsousY07.42 
NqfuerubypbzjjM20 Essential (primary) hypertensionNew Medication:Amlodipine 
Besylate 5 mg - 1 by mouth every day

## 2019-07-16 NOTE — OP
Operative Report - Blank





- Operative Report


Date of Operation: 07/16/19


Note: 





SANTOS GARCIA


1950





Date of Surgery: 7/16/19





Eve Gifford MD





Assistant: NAYA VIGIL did help throughout the procedure with preparation 

of the knee, wound retraction, manipulation of the knee, and wound closure.  





Anesthesiologist: VENKATA Hanson MD





Anesthesia Type: Spinal





Preoperative Diagnosis: Left severe degenerative osteoarthritis of the knee





Postoperative Diagnosis: As above





Procedure Performed: Left Total Knee Arthroplasty





Tourniquet time: 60 minutes





Complications: None





Specimen: Bone and cartilage from the left knee joint sent to pathology.  





Hardware Used: Cemented Smith and Nephew total knee hardware was used - For the 

femur a size 6 left legion posterior stabilized femoral component, for the 

tibia a size 5 left jessenia II tibial baseplate, for the insert a size 11 mm 5-

6 posterior stabilized articular polyethylene insert, and for the patella a 

size 32  3-peg all poly patella.  





Brief History/Indication: SANTOS GARCIA was known in clinic and had a 

history of severe left knee pain and swelling. She failed conservative 

treatment with anti-inflammatories, pain pills, intra-articular injections and 

physical therapy.  She elected to undergo left total knee arthroplasty due to 

continued pain and decreased quality of life.  Radiographs showed severe end 

stage osteoarthritis of the knee with bone on bone contact.  Informed consent 

was obtained from the patient.  She understood the risks of surgery included 

but were not limited to: bleeding, infection, damage to nearby structures, 

intraoperative fracture, nerve palsy, failure of the hardware, early loosening, 

knee stiffness or loss of motion, anesthesia complications, stroke, heart attack

, blood clot and death.  She wished to proceed.





Intra-Operative Findings: Intraoperatively the patient was noted to have severe 

loss of cartilage in all 3 compartments of the knee.  





Description of the Procedure: 





SANTOS GARCIA was identified in the preanesthesia unit. Her left knee was 

marked as the correct operative side.  Informed consent was signed and placed 

in the chart. The patient was taken to the operating room and placed under 

anesthesia without complication. A luz catheter was placed. A tourniquet was 

placed on the left thigh. The left lower extremity was prepped and draped in 

the usual sterile fashion. Preoperative time-out was made to correctly identify 

the patient, side and site. Appropriate intraoperative antibiotics were given 

within one hour of incision.





Tourniquet was inflated. A midline incision was made and carried sharply down 

to the extensor mechanism. A new 10 blade was used to make a standard medial 

parapatellar arthrotomy. The patella was subluxed laterally. Electrocautery was 

used to dissect soft tissue off the superomedial tibia to the midsagittal 

plane.  The knee was flexed up. The anterior horn of the lateral meniscus and 

the ACL were sharply incised. A drill was used to enter the distal femur. The 

intramedullary distal femoral cutting guide was pinned on the distal femur. The 

oscillating saw was used to make the distal femoral cut.





The external rotation guide was pinned on the distal femur and the distal femur 

was sized to a size 6. The size 6 multi-cutting jig was pinned on the distal 

femur. The oscillating saw was used to make the appropriate 4 chamfer cuts.  

Next the PCL was completely released.  The extramedullary tibial cutting guide 

was pinned on the proximal tibia and the oscillating saw was used to make the 

proximal tibial cut perpendicular to the mechanical axis of the tibia. The bone 

was carefully removed.





The knee was brought out into full extension. The spacer block was placed and 

had excellent fit with the knee in full extension. The medial and lateral 

ligaments were well balanced. The flexion and extension gaps were well 

balanced. The knee was flexed up. Lamina  was placed both medially and 

laterally. Any remaining meniscus was removed with electrocautery.  Curved 

osteotome was used to remove any posterior osteophytes. The tibial tray and 

drop shaun were placed and confirmed a satisfactory tibial cut.





The size 6 left narrow femoral trial was impacted onto the distal femur. This 

trial had excellent fit and stability. The box for the posterior stabilized 

implant was prepared using a box cut osteotome and a reamer. Next a tibial tray 

trial and 9 mm insert trial was placed. The knee was taken through a range of 

motion and had full extension to 130 degrees of flexion. Patellofemoral 

tracking was satisfactory.





The patella was inverted and sized to a size 32. Three peg holes were drilled 

through the size 32 drill guide. The trial patella was placed and the knee was 

taken through a range of motion. There was satisfactory patellofemoral 

tracking. All trials were removed. The tibia was subluxed anteriorly and sized 

to a size 5. The proximal tibial was prepared with a size 5 keel punch.  All 

bony cut surfaces were irrigated with sterile saline and dried. Final implants 

were cemented into place starting with the tibia, followed by the femur, and 

last the patella. A 9 mm insert trial was placed and the knee was brought into 

full extension.





Tourniquet was turned down and the knee was copiously irrigated with sterile 

saline. Electrocautery was used to obtain meticulous hemostasis. Once the 

cement had fully cured, the insert trial was removed. Any excess cement was 

removed from around the hardware and capsule.  Final insert chosen was a 11 mm 

posterior stabilized Jessenia II articular insert size 5-6. Stability of the 

insert was checked and noted to be stable.





The extensor mechanism was closed using number 1 vicryls. The rest of the 

incision was closed in a layered fashion using 0 and 2-0 vicryls. The skin was 

closed using 3-0 nylon suture. Sterile xeroform, 4x4s and webril were used to 

cover the incision.  Ace wrap and cold pack were used to cover the dressings. 

The patients anesthesia was reversed without difficulty. She was taken to the 

PACU in stable condition.  Intended weight-bearing will be as tolerated.

## 2019-07-16 NOTE — XMS REPORT
Continuity of Care Document (CCD)

 Created on:2019



Patient:Caryn Garcia

Sex:Female

:1950

External Reference #:[id number]





Demographics







 Address  [address] [place] [address]

 

 Home Phone  [phone]

 

 Mobile Phone  [phone]

 

 Email Address  [email or web address]

 

 Preferred Language  en

 

 Marital Status  Not  or 

 

 Buddhist Affiliation  Unknown

 

 Race  White

 

 Ethnic Group  Not  or 









Author







 Name  Loly Rodriguez









Support







 Name  Relationship  Address  Phone

 

 Dorothy Richards  Unavailable  Unavailable  [phone]









Care Team Providers







 Name  Role  Phone

 

 Juanita Smith MD  Primary Care Physician  Unavailable









Payers







 Date  Identification Numbers  Payment Provider  Subscriber

 

 Effective: 2014  Policy Number: [id number]  Medicare  Caryn Garcia









 PayID: [id number]  [address]









 [place] [address]









   Policy Number: [id number]  [facility]  Caryn Garcia









 Group Number: [id number]  [address]

 

 PayID: [id number]  [place] [address]









 Effective: 2014  Policy Number: [id number]  [employer]  Chinmay ZELAYA Tamir 
III









 Expires: 2018  PayID: [id number]  [address] [address]









 [place] [address]







Problems







 Active Problems  Provider  Date

 

 Obstructive sleep apnea syndrome  Grace Johnston DNP, RN, FNP-BC  Onset: 2011

 

 Localized, primary osteoarthritis  Eve Gifford M.D.  Onset: 2017

 

 Heart sounds abnormal  Grace Johnston DNP, RN, FNP-BC  Onset: 2018







Family History







 Date  Family Member(s)  Observation  Comments

 

   General  Cancer  

 

   General  Hypertension  

 

 Onset:  (age  Father  Lung Cancer  HEAVY SMOKER 



 72 Years)      ASTHMA ALLERGIES ECZEMA

 

   Mother  Hypertension  CAD  MOTHER  AGE 93



       CHF

 

 Onset:  (age  Mother  Congestive Heart Failure (CHF)  



 93 Years)      

 

 Onset:  (age  Mother  Thyroid Disease  THYROID CANCER



 84 Years)      

 

   Mother  Hypercholesterolemia  HYPERLIPIDEMIA

 

   Siblings  1  Sister - congenital



       heart defect hole in



       heart







Social History







 Type  Date  Description  Comments

 

 Birth Sex    Unknown  

 

 Marital Status      

 

 Lives With    Alone  

 

 Occupation    Retired  

 

 ETOH Use    Occasionally consumes  1 glass of good red



     alcohol  wine

 

 Tobacco Use  Start: Unknown  Patient has never  



     smoked  

 

 Recreational Drug Use    Denies Drug Use  

 

 Smoking Status  Reviewed: 19  Patient has never  



     smoked  

 

 Exercise Type/Frequency    Exercises regularly  physical therapy desk



       stationary bike goes



       to [employer]







Allergies, Adverse Reactions, Alerts







 Description

 

 No Known Drug Allergies







Medications







 Active Medications  SIG  Qnty  Indications  Ordering  Date



         Provider  

 

 Amlodipine Besylate  1 by mouth every  90tabs  I10  Amado Choudhury,  2019



                  10mg  day      DO FACC  



 Tablets          



           

 

 Multivital        Unknown  

 

 Tumeric  99 mg  1 tab po      Unknown  



   qd        

 

 Loratadine  once a day for      Unknown  



         10mg Capsules  allergies as        



   needed        

 

 Acetaminophen  2 every 6 hours      Unknown  



            500mg  as needed        



 Tablets          



           

 

 Mometasone Furoate  spray two sprays      Unknown  



   in each nostril        



 50mcg/Act Suspension  twice daily        



           

 

 Metrocream  apply twice a day      Unknown  



         0.75% Cream  - thin film as        



   needed        

 

 Senna S  take 2 t d at hs      Unknown  



      8.6-50mg Tablets          



           

 

 Cpap  for use while      Unknown  



    Device  sleeping 10 cm        



           

 

 Atorvastatin Calcium  Take 1 Tablet By      Unknown  



                   20mg  Mouth Every Day        



 Tablets          



           

 

 Potassium  1 by mouth every      Unknown  



        75mg Tablets  day        



           

 

 Biotin  take one      Unknown  



     5mg Capsules  capsule/tablet        



   daily at lunch        

 

 Proctosol HC  as needed      Unknown  



           2.5%          



           

 

 Losartan Potassium  one by mouth      Unknown  



                 100mg  daily        



           

 

 Vesicare  one by mouth      Unknown  



       10mg  daily        



           

 

 Chlorthalidone  one by mouth      Unknown  



             25mg  daily        



           









 History Medications









 Metoprolol Succinate  1 by mouth every day  7tabs    Amado MA  2019 -



 ER        Kei DO FACC  2019



  25mg Tablets ER 24HR          



           

 

 Amlodipine Besylate  1 by mouth every day  30tabs  I10  Amado MA  2019 -



                   5mg        Kei DO FACC  2019



 Tablets          



           

 

 Cephalexin  1 tablet every 8  30caps    Eve Gifford,  2018 -



          500mg  hours x 10 days      M.D.  2019



 Capsules          



           

 

 Metoprolol Succinate  one by mouth daily      Unknown   -



 ER          2019



  50mg          



           

 

 Atorvastatin Calcium  one by mouth daily      Unknown   -



           2019



 20mg          



           

 

 Nasonex  as needed      Unknown   -



       50mcg          2019



           

 

 Omega 3 500  i      Unknown   -



           500mg          2019



 Capsules          



           

 

 Vitamin D  by mouth everyday      Unknown   -



         1000Unit          2019



 Tablets          



           

 

 Ec-81 Aspirin  take 1 t po d last      Unknown   -



             81mg  took in feb because        2019



 Tablets   of hysterectomy        



   never told to resume        

 

 Vitamin K  tk 3 t d      Unknown   -



 (Phytonadione)          2019



              100mcg          



 Tablets          



           







Medications Administered in Office







 Medication  SIG  Qnty  Indications  Ordering Provider  Date

 

 Technetium TC 99M        [facility]  2019



 Tetrofosmin, Per Unit Dose          



 Up To 40 Millicuries          



              Injection          



           

 

 Inj, Regadenoson, 0.1 MG        Amado Choudhury, DO FAC  2019



                  Injection          



           

 

 Technetium TC 99M        Amado Choudhury, DO FACC  2019



 Tetrofosmin, Per Unit Dose          



 Up To 40 Millicuries          



              Injection          



           

 

 Depomedrol 40MG        Eve Gifford M.D.  2018



         Injection          



           

 

 Depomedrol 40MG        Eve Gifford M.D.  05/15/2017



         Injection          



           

 

 Technetium TC 99M        [facility] Schedule  05/15/2017



 Tetrofosmin, Per Unit Dose          



 Up To 40 Millicuries          



              Injection          



           

 

 Inj, Regadenoson, 0.1 MG        Red Santa M.D.,  2017



                  Injection        JASON CHISHOLM  



           

 

 Technetium TC 99M        Red Santa M.D.,  2017



 Tetrofosmin, Per Unit Dose        JASON CHISHOLM  



 Up To 40 Millicuries          



              Injection          



           







Vital Signs







 Date  Vital  Result  Comment

 

 2019  9:39am  Height  66 inches  5'6"









 Weight  259.00 lb  

 

 Heart Rate  80 /min  

 

 BP Systolic  118 mmHg  

 

 BP Diastolic  64 mmHg  

 

 BMI (Body Mass Index)  41.8 kg/m2  









 2019  7:47am  Height  66 inches  5'6"









 Weight  258.00 lb  with sandals

 

 Heart Rate  81 /min  

 

 BP Systolic Sitting  150 mmHg  lue large cuff

 

 BP Diastolic Sitting  74 mmHg  lue large cuff

 

 BP Systolic Standing  152 mmHg  lue large cuff

 

 BP Diastolic Standing  76 mmHg  lue large cuff

 

 Respiratory Rate  14 /min  

 

 BMI (Body Mass Index)  41.6 kg/m2  

 

 Ejection Fraction  60-65%  echo. 19  8:24am  Height  66 inches  5'6"









 Weight  257.00 lb  CLothes/shoes

 

 Heart Rate  82 /min  Radial

 

 BP Systolic Sitting  162 mmHg  Rue Lg cuff

 

 BP Diastolic Sitting  90 mmHg  Rue Lg cuff

 

 BP Systolic Standing  158 mmHg  Lue Lg cuff

 

 BP Diastolic Standing  90 mmHg  Lue Lg cuff

 

 BMI (Body Mass Index)  41.5 kg/m2  









 2019 10:11am  Height  66 inches  5'6"









 Weight  260.00 lb  

 

 Heart Rate  81 /min  

 

 O2 % BldC Oximetry  97 %  

 

 BMI (Body Mass Index)  42.0 kg/m2  









 2018  2:28pm  Height  66 inches  5'6"









 Weight  252.00 lb  

 

 BP Systolic  126 mmHg  

 

 BP Diastolic  71 mmHg  

 

 Respiratory Rate  16 /min  

 

 Pain Level  2  

 

 BMI (Body Mass Index)  40.7 kg/m2  









 2018  1:09pm  Height  66 inches  5'6"









 Weight  252.00 lb  

 

 Heart Rate  60 /min  

 

 BP Systolic Sitting  138 mmHg  

 

 BP Diastolic Sitting  82 mmHg  

 

 Respiratory Rate  14 /min  

 

 O2 % BldC Oximetry  97 %  

 

 BMI (Body Mass Index)  40.7 kg/m2  









 2017  9:44am  Height  66 inches  5'6"









 Weight  256.00 lb  

 

 Heart Rate  70 /min  

 

 BP Systolic Sitting  138 mmHg  

 

 BP Diastolic Sitting  74 mmHg  

 

 Respiratory Rate  28 /min  

 

 O2 % BldC Oximetry  96 %  room air

 

 BMI (Body Mass Index)  41.3 kg/m2  









 05/15/2017 10:42am  Height  66 inches  5'6"









 Weight  255.00 lb  

 

 Heart Rate  76 /min  

 

 BP Systolic  154 mmHg  

 

 BP Diastolic  71 mmHg  

 

 Pain Level  2  

 

 BMI (Body Mass Index)  41.2 kg/m2  









 2017 10:15am  Height  66 inches  5'6"









 Weight  255.00 lb  

 

 Heart Rate  90 /min  

 

 BP Systolic  170 mmHg  

 

 BP Diastolic  80 mmHg  

 

 Body Temperature  97.3 F  

 

 BMI (Body Mass Index)  41.2 kg/m2  









 2016 11:38am  Height  66 inches  5'6"









 Weight  245.00 lb  

 

 Heart Rate  82 /min  

 

 BP Systolic  148 mmHg  

 

 BP Diastolic  70 mmHg  

 

 Respiratory Rate  14 /min  

 

 O2 % BldC Oximetry  96 %  

 

 BMI (Body Mass Index)  39.5 kg/m2  









 2014  3:13pm  Height  66 inches  5'6"









 Weight  260.00 lb  

 

 Heart Rate  77 /min  

 

 BP Systolic Sitting  140 mmHg  

 

 BP Diastolic Sitting  66 mmHg  

 

 Respiratory Rate  18 /min  

 

 O2 % BldC Oximetry  97 %  

 

 BMI (Body Mass Index)  42.0 kg/m2  









 2014 10:38am  Height  66 inches  5'6"









 Weight  252.00 lb  

 

 Heart Rate  78 /min  

 

 BP Systolic  164 mmHg  

 

 BP Diastolic  98 mmHg  

 

 BMI (Body Mass Index)  40.7 kg/m2  







Results







 Test  Date  Facility  Test  Result  H/L  Range  Note

 

 Laboratory test  2019  [facility]  Potassium  TNP mmol/L    
3.5-5.0  1



 finding    [address]        



     [place] [address] [phone]          









 Magnesium  TNP mg/dL    1.9-2.7  2









 Basic Metabolic Panel  2019  [facility]  Sodium  137 mmol/L  
N  135-145  



     [address]          



     [place] [address] [phone]          









 Chloride  101 mmol/L  N  101-111  

 

 Co2 Carbon Dioxide  29 mmol/L  N  22-32  

 

 Glucose  119 mg/dL  High    3

 

 Blood Urea Nitrogen  13 mg/dL  N  6-24  4

 

 Creatinine  0.39 mg/dL  Low  0.51-0.95  5

 

 BUN/Creatinine Ratio  33.3  High  8-20  

 

 Calcium  9.9 mg/dL  N  8.6-10.3  6

 

 Egfr Non-  163.0    >60  

 

 Egfr   197.2    >60  7

 

 Potassium  TNP mmol/L    3.5-5.0  8

 

 Anion Gap  7 mmol/L  N  2-11  









 Laboratory test  2019  [facility]  Magnesium  TNP mg/dL    1.9
-2.7  9



 finding    [address]          



     [place] [address] [phone]          









 B-Type Natriuretic Peptide BNP  16 pg/mL    <=100  









 Laboratory test  2019  [facility]  B-Type Natriuretic  <
pending>      



 finding    [address]  Peptide BNP        



     [place] [address] [phone]          









 Potassium  <pending>      

 

 Magnesium  <pending>      









 Laboratory test  2017  [facility]  Cytology  SEE RESULT      
10



 finding    [address]  Non-Gyn  [address]      



     [place] [address] [phone]          









 1  Specimen Hemolyzed. Result may not be valid.



   Unable to report test result due to hemolysis.

 

 2  Unable to report test result due to hemolysis.

 

 3  Specimen hemolyzed. Result may not be valid.

 

 4  Specimen hemolyzed. Result may not be valid.

 

 5  Specimen hemolyzed. Result may not be valid.

 

 6  Specimen hemolyzed. Result may not be valid.

 

 7  *******Because ethnic data is not always readily available,



   this report includes an eGFR for both -Americans and



   non- Americans.****



   The National Kidney Disease Education Program (NKDEP) does



   not endorse the use of the MDRD equation for patients that



   are not between the ages of 18 and 70, are pregnant, have



   extremes of body size, muscle mass, or nutritional status,



   or are non- or non-.



   According to the National Kidney Foundation, irrespective of



   diagnosis, the stage of the disease is based on the level of



   kidney function:



   Stage Description                      GFR(mL/min/1.73 m(2))



   1     Kidney damage with normal or decreased GFR       90



   2     Kidney damage with mild decrease in GFR          60-89



   3     Moderate decrease in GFR                         30-59



   4     Severe decrease in GFR                           15-29



   5     Kidney failure                       <15 (or dialysis)

 

 8  Specimen Hemolyzed. Result may not be valid.



   Unable to report test result due to hemolysis.

 

 9  Unable to report test result due to hemolysis.

 

 10  SEE RESULT BELOW



   -----------------------------------------------------------------------------
---------------



   Name:  CARYN GARCIA                  : 1950    Attend Dr: Richi Valencia MD



   Acct:  [id number]  Unit: [id number]  AGE: 67            Location:  THYROID



   Re17                        SEX: F             Status:    REG REF



   -----------------------------------------------------------------------------
---------------



   



   SPEC: [id number]            LORI: 17-0      [facility] DR: Richi Valencia MD



   REQ:  [id number]             RECD: 



   STATUS: AZRA HARKINS DR: Juanita Goodrich MD



   _



   ORDERED:  FNA-IMG GUID BX, CYTO ADEQ-1ST P



   



   FINAL DIAGNOSIS



   



   



   



   Thyroid, left lower, Ultrasound guided, fine needle



   aspiration:



   --Benign thyroid nodule- involutional type (Weems Class



   II).



   



   



   



   



   



   



   The specimen demonstrates moderate watery proteinaceous fluid,



   a moderate amount of benign appearing follicular epithelium



   arranged in uniform sheets, medium sized follicles and only



   occasional small groups.  Abundant pigmented and non-pigmented



   macrophages are seen in the background.  No features of



   papillary carcinoma are seen.  In this clinical setting the



   risk of malignancy is less than 3%.  Clinical management of



   this thyroid nodule should be based on clinical and



   radiographic features as well as the above findings.



   THYROID LEFT - US GUIDED FINE NEEDLE ASPIRATION LEFT LOWER THYROID NODULE



   



   CLINICAL HISTORY



   



   Left lower thyroid nodule 5.3 x 3.9 x 4.4cm



   



   IMMEDIATE INTERPRETATION



   



   Pass 1-adequate



   



   



   



   



   ** CONTINUED ON NEXT PAGE **



   



   * ML=Testing performed at Main Lab



   DEPARTMENT OF PATHOLOGY,  [address] [place] [address]



   Phone # [phone]      Fax #[phone]



   Olu Quigley M.D. Director     [facility] # [id number]



   



   



   



   RUN DATE: 17               [facility] LAB **LIVE**         
       PAGE    2



   



   -----------------------------------------------------------------------------
---------------



   Patient: CARYN GARCIA                   [id number]     (Continued)



   -----------------------------------------------------------------------------
---------------



   



   GROSS DESCRIPTION          (Continued)



   



   



   GROSS DESCRIPTION



   



   2- alcohol fixed slide(s)



   1- passes



   



   Signed __________(signature on file)___________ Olu Quigley MD  [id number]



   



   -----------------------------------------------------------------------------
---------------



   



   



   



   



   



   



   



   



   



   



   



   



   



   



   



   



   



   



   



   



   



   



   



   



   



   



   



   



   



   



   



   



   



   



   ** END OF REPORT **



   



   * ML=Testing performed at Main Lab



   DEPARTMENT OF PATHOLOGY,  [address] [place] [address]



   Phone # [phone]      Fax #[phone]



   Olu Quigley M.D. Director     [facility] # [id number]







Procedures







 Date  Code  Description  Status

 

 2019  37238  EKG Tracing & Interpretation  Completed

 

 2019  75152  ECHO Transthorasic Realtime 2D W Doppler & Color Flow Hosp  
Completed

 

 2019  72608  Myocardial Perfusion Imaging Tomographic (Spect) Multiple  
Completed



     Studies  

 

 2019  93213  EKG Tracing & Interpretation  Completed

 

 2018  14225  Inject/Drain Joint/Bursa Major W/O US  Completed

 

 05/15/2017  40769  Inject/Drain Joint/Bursa Major W/O US  Completed

 

 2017  38951  Stress Test  Completed

 

 2017  48085  Myocardial Perfusion Imaging Tomographic (Spect) Multiple  
Completed



     Studies  







Encounters







 Type  Date  Location  Provider  Dx  Diagnosis

 

 Office Visit  2019  [facility]  Amado Choudhury,  I10  Essential (
primary)



   8:00a  Of Cma  DO FACC    hypertension









 I44.7  Left bundle-branch block, unspecified

 

 Z01.810  Encounter for preprocedural cardiovascular examination

 

 G47.33  Obstructive sleep apnea (adult) (pediatric)

 

 I11.9  Hypertensive heart disease without heart failure









 Office Visit  2019  Jaclyn MA  Z01.810  Encounter for



   8:45a  Cardiology Of  DO Kei    preprocedural



     [facility]    cardiovascular



           examination









 M22.92  Unspecified disorder of patella, left knee

 

 I44.7  Left bundle-branch block, unspecified

 

 R06.02  Shortness of breath

 

 E87.6  Hypokalemia

 

 E83.42  Hypomagnesemia

 

 I10  Essential (primary) hypertension

 

 Z68.41  Body mass index (BMI) 40.0-44.9, adult

 

 E66.8  Other obesity

 

 G47.33  Obstructive sleep apnea (adult) (pediatric)

 

 R94.31  Abnormal electrocardiogram [ECG] [EKG]









 Office Visit  2019  9:45a  [facility]  Eve Gifford,  M25.562  
Pain in left



     Of [facility]  M.D.    knee









 M25.462  Effusion, left knee

 

 M17.12  Unilateral primary osteoarthritis, left knee









 Office Visit  2018  2:15p  [facility]  Eve Gifford,  M25.562  
Pain in left



     Of [facility]  M.D.    knee









 M25.462  Effusion, left knee

 

 M17.12  Unilateral primary osteoarthritis, left knee









 Office Visit  2018  [facility]  Grace  G47.33  Obstructive sleep



   1:15p  [facility]  GAGE Johnston RN,    apnea (adult)



     [facility]  ABIMAEL-BC    (pediatric)









 R00.8  Other abnormalities of heart beat









 Office Visit  2017  [facility]  Grace  G47.33  Obstructive sleep



   9:30a  [facility]  GAGE Johnston RN,    apnea (adult)



     [facility]  ABIMAEL-BC    (pediatric)

 

 Office Visit  2017  [facility]  Eve Gifford,  M25.462  Effusion, left



   10:00a  Services Of  M.D.    knee



     [facility]      









 M25.562  Pain in left knee

 

 M17.12  Unilateral primary osteoarthritis, left knee









 Office Visit  2016  [facility]  Grace  G47.33  Obstructive sleep



   11:15a  [facility]  GAGE Johnston    apnea (adult)



     [facility]  DYLAN MERCHANT    (pediatric)

 

 Office Visit  2014  [facility]  Grace  327.23  Obstructive Sleep



   3:00p  [facility]  GAGE Johnston,    Apnea Adult &



     Cma  RN, FNP-BC    Pediatric

 

 Office Visit  2014  [facility]  Eleno  715.36  Osteoarthrosis



   10:00a  Services Of  TOM Palenciad Not Spec



     [facility]      Prime Or 2Ndy Lower



           Leg







Plan of Treatment

Future Appointment(s):2019 10:30 am - Eve Gifford M.D. at [facility]2019 11:30 am - Tad Trimble PA-C at [facility]2019 11:30 am - LANETTE Myers at [facility]2019 11:30 am - YARITZA Hernandez at [facility]2020  9:20 am - Perfecto Hendrix [facility] Ltd[facility]ct[facility]djz28/16/2019 11:
30 am - Eve Gifford M.D. at [facility]2019 - Eve Gifford M.D.M25.462 Effusion, left kneeFollow up:Follow up:   2 weeks after 
oanqvbkR82.562 Pain in left kneeM17.12 Unilateral primary osteoarthritis, left 
knee

## 2019-07-16 NOTE — CONSULT
Subjective


Date of Service: 07/16/19


Interval History: 





Patient is 69 y/o female with history of HTN, Hypelipidemia, [place] admitted for 

elective left TKA.  We were asked by ortho for consultation and co-manage her 

HTN, Hyperlipidemia and [place].  Patient seen on Short stay post op, complaining 

of pain and headache but no chest pain and no shortness of breath.  


Family History: Unchanged from Admission


Social History: Unchanged from Admission


Past Medical History: Unchanged from Admission





Review of Systems





- Measurements


Intake and Output: 


Intake and Output Last 24 Hours











 07/14/19 07/15/19 07/16/19 07/17/19





 06:59 06:59 06:59 06:59


 


Intake Total    2860


 


Output Total    1475


 


Balance    1385


 


Weight    255 lb


 


Intake:    


 


  IV Fluids    2000


 


    LR    2000


 


  Oral    860


 


Output:    


 


  Boss    1225


 


  Estimated Blood Loss    250














- Review of Systems


Constitutional Symptoms: 


   Negative: Weight Gain, Fever


Dermatology: 


   Negative: Skin Lesions, Skin Lumps


HEENT: Positive: Normal


   Negative: Vertigo


Eyes: 


   Negative: Change in Vision


Thyroid: Positive: Other - s/p partial thyroidectomy.  not on any medicaitons


Pulmonary: 


   Negative: Cough, Wheezing


Cardiology: 


   Negative: Chest Pain, Shortness of Breath


Gastroenterology: 


   Negative: Abdominal Pain, Nausea, Vomiting


Musculoskeletal: Positive: Joint Pain





Objective


Active Medications: 








Acetaminophen (Tylenol Tab*)  975 mg PO Q8H PRN


   PRN Reason: PAIN


Amlodipine Besylate (Norvasc Tab*)  10 mg PO QAM ALEKS


Apixaban (Eliquis*)  2.5 mg PO BID ALEKS


Atorvastatin Calcium (Lipitor*)  20 mg PO QAM ALEKS


Bisacodyl (Dulcolax Supp*)  10 mg [place] DAILY PRN


   PRN Reason: constipation


Cetirizine HCl (Zyrtec*)  10 mg PO DAILY PRN


   PRN Reason: CONGESTION


Chlorthalidone (Hygroton Tab*)  25 mg PO QAM ALEKS


Cyclobenzaprine HCl (Flexeril Tab*)  5 mg PO TID PRN


   PRN Reason: SPASMS


Diphenhydramine HCl (Benadryl Iv*)  12.5 mg IV Q6H PRN


   PRN Reason: PRURITIS


Diphenhydramine HCl (Benadryl Liq*)  12.5 mg PO Q6H PRN


   PRN Reason: itching


Diphenhydramine HCl (Benadryl Po*)  25 mg PO Q6H PRN


   PRN Reason: itching


Docusate Sodium (Colace Cap*)  100 mg PO BID [facility]


Fluticasone Propionate (Flonase Nasal Spray 50mcg*)  2 spray BOTH NARES BEDTIME 

[facility]


Tranexamic Acid 1,000 mg/ (Sodium Chloride)  60 mls @ 120 mls/hr IV ONCE


   Stop: 07/16/19 23:59


Cefazolin Sodium 1 gm/ Sodium (Chloride)  50 mls @ 200 mls/hr IVPB Q8H [facility]


   Stop: 07/17/19 11:44


Lactated Ringer's (Lactated Ringers 1000 Ml Bag*)  1,000 mls @ 100 mls/hr IV 

PER RATE [facility]


   Last Admin: 07/16/19 16:23 Dose:  100 mls/hr


Lactulose (Lactulose*)  30 ml PO Q6H PRN


   PRN Reason: constipation


Losartan Potassium (Cozaar Tab*)  100 mg PO QAM [facility]


Magnesium Hydroxide (Milk Of Magnesia Liq*)  30 ml PO BID [facility]


Magnesium Hydroxide (Milk Of Magnesia Liq*)  30 ml PO Q6H PRN


   PRN Reason: constipation


Morphine Sulfate (Morphine Inj (Syringe))*)  2 mg IV Q2H PRN


   PRN Reason: PAIN - UNRELIEVED


Nft: Hydrocortisone [Proctozone-Hc] 2.5 %  2.5 % TOPICAL QAM [facility]


Nft: Potassium [ (Potassium] 99 Mg))  99 mg PO 1200 [facility]


Ondansetron HCl (Zofran Inj*)  4 mg IV Q6H PRN


   PRN Reason: nausea


Ondansetron HCl (Zofran Odt Tab*)  4 mg PO Q6H PRN


   PRN Reason: NAUSEA


Oxycodone HCl (Roxycodone Tab*)  10 mg PO Q4H PRN


   PRN Reason: PAIN - SEVERE


   Last Admin: 07/16/19 18:23 Dose:  10 mg


Oxycodone/Acetaminophen (Percocet 5/325 Tab*)  2 tab PO Q4H PRN


   PRN Reason: PAIN - MODERATE


   Last Admin: 07/16/19 16:41 Dose:  2 tab


Polyethylene Glycol/Electrolytes (Miralax*)  17 gm PO DAILY PRN


   PRN Reason: Constipation


Solifenacin (Vesicare(Nf))  10 mg PO QAM ALEKS


Temazepam (Restoril Cap*)  15 mg PO BEDTIME PRN


   PRN Reason: INSOMNIA


Tramadol HCl (Ultram*)  50 mg PO Q6H PRN


   PRN Reason: PAIN








 Vital Signs - 8 hr











  07/16/19 07/16/19 07/16/19





  13:50 13:56 14:00


 


Temperature 98.8 F  97.9 F


 


Pulse Rate 95 85 76


 


Respiratory 20 16 18





Rate   


 


Blood Pressure 122/65 121/53 113/48





(mmHg)   


 


O2 Sat by Pulse 95 95 94





Oximetry   














  07/16/19 07/16/19 07/16/19





  14:01 14:05 14:15


 


Temperature   


 


Pulse Rate 85 84 84


 


Respiratory 25 18 23





Rate   


 


Blood Pressure 96/57 109/56 105/52





(mmHg)   


 


O2 Sat by Pulse 94 94 94





Oximetry   














  07/16/19 07/16/19 07/16/19





  14:30 14:46 15:01


 


Temperature 97.1 F  


 


Pulse Rate 82 86 79


 


Respiratory 14 25 27





Rate   


 


Blood Pressure 126/47 110/64 113/55





(mmHg)   


 


O2 Sat by Pulse 95 94 96





Oximetry   














  07/16/19 07/16/19 07/16/19





  15:15 15:30 15:45


 


Temperature   


 


Pulse Rate 80 81 78


 


Respiratory 23 21 16





Rate   


 


Blood Pressure 105/57 108/53 116/59





(mmHg)   


 


O2 Sat by Pulse 95 94 96





Oximetry   














  07/16/19 07/16/19 07/16/19





  16:00 16:41 16:54


 


Temperature 96.8 F  


 


Pulse Rate 78  


 


Respiratory 22 14 14





Rate   


 


Blood Pressure 113/56  





(mmHg)   


 


O2 Sat by Pulse 96  





Oximetry   














  07/16/19 07/16/19 07/16/19





  16:55 18:23 18:40


 


Temperature   98.4 F


 


Pulse Rate   75


 


Respiratory 14 16 17





Rate   


 


Blood Pressure   123/43





(mmHg)   


 


O2 Sat by Pulse 95  94





Oximetry   











Oxygen Devices in Use Now: Nasal Cannula


Appearance: awake, pleasant no distress


Eyes: No Scleral Icterus, PERRLA


Ears/Nose/Mouth/Throat: NL Teeth, Lips, Gums, Mucous Membranes Moist


Neck: NL Appearance and Movements; NL JVP, Trachea Midline


Respiratory: Symmetrical Chest Expansion and Respiratory Effort, Clear to 

Auscultation


Cardiovascular: NL Sounds; No Murmurs; No JVD, No Edema


Abdominal: NL Sounds; No Tenderness; No Distention


Extremities: No Edema, - - left joint in dressing post operatively


Neurological: Alert and Oriented x 3


Result Diagrams: 


 07/17/19 06:38





 07/17/19 06:38





Assessment/Plan - Billing


Plan By Medical Problem: 





1.  Left knee osteoarthritis s/p left total knee arthroplasty 7/16/19


- Pain control, activity, PT as per ortho





2.  HTN - Continue amlodipine 10 mg daily; hold chlorthalidone 25 mg for now as 

she may run low BP due to post op and pain meds; Losartan 100 mg daily





3.  Hyperlipidemia - Continue atorvastatin 20 mg daily





4.  [place] - May use her own CPAP





5.  Hypothyroidism - I don't see it listed on her home meds.   last TSH 6/11/19 

was therapeutic.  This can be deferred for outpatient, it would not be useful 

to check level in the acute setting and post operatively.  Defer to PCP





VTE PPX: 


As per ortho currently on Eliquis 2.5 mg bid.








Diet: 


Regular

## 2019-07-16 NOTE — PN
Progress Note





- Progress Note


Date of Service: 07/16/19


Note: 


Patient seen at bedside, she is having some mild knee pain s/p left total knee 

arthroplasty.   Mild nausea. Active DF left foot.  Sensation intact distally.

## 2019-07-17 LAB
ANION GAP SERPL CALC-SCNC: 10 MMOL/L (ref 2–11)
BUN SERPL-MCNC: 13 MG/DL (ref 6–24)
BUN/CREAT SERPL: 25.5 (ref 8–20)
CALCIUM SERPL-MCNC: 8.8 MG/DL (ref 8.6–10.3)
CHLORIDE SERPL-SCNC: 97 MMOL/L (ref 101–111)
GLUCOSE SERPL-MCNC: 137 MG/DL (ref 70–100)
HCO3 SERPL-SCNC: 29 MMOL/L (ref 22–32)
HCT VFR BLD AUTO: 35 % (ref 35–47)
HGB BLD-MCNC: 12 G/DL (ref 12–16)
PLATELET # BLD AUTO: 196 10^3/UL (ref 150–450)
POTASSIUM SERPL-SCNC: 3 MMOL/L (ref 3.5–5)
SODIUM SERPL-SCNC: 136 MMOL/L (ref 135–145)

## 2019-07-17 RX ADMIN — SODIUM CHLORIDE, SODIUM LACTATE, POTASSIUM CHLORIDE, AND CALCIUM CHLORIDE SCH MLS/HR: 600; 310; 30; 20 INJECTION, SOLUTION INTRAVENOUS at 02:34

## 2019-07-17 RX ADMIN — MAGNESIUM HYDROXIDE SCH ML: 400 SUSPENSION ORAL at 09:46

## 2019-07-17 RX ADMIN — APIXABAN SCH MG: 2.5 TABLET, FILM COATED ORAL at 09:45

## 2019-07-17 RX ADMIN — CEFAZOLIN SCH MLS/HR: 1 INJECTION, POWDER, FOR SOLUTION INTRAVENOUS at 11:12

## 2019-07-17 RX ADMIN — OXYCODONE HYDROCHLORIDE PRN MG: 5 CAPSULE ORAL at 03:24

## 2019-07-17 RX ADMIN — AMLODIPINE BESYLATE SCH MG: 5 TABLET ORAL at 09:44

## 2019-07-17 RX ADMIN — OXYCODONE HYDROCHLORIDE AND ACETAMINOPHEN PRN TAB: 5; 325 TABLET ORAL at 21:05

## 2019-07-17 RX ADMIN — TRAMADOL HYDROCHLORIDE PRN MG: 50 TABLET, FILM COATED ORAL at 16:53

## 2019-07-17 RX ADMIN — OXYCODONE HYDROCHLORIDE PRN MG: 5 CAPSULE ORAL at 11:24

## 2019-07-17 RX ADMIN — LOSARTAN POTASSIUM SCH MG: 25 TABLET, FILM COATED ORAL at 09:46

## 2019-07-17 RX ADMIN — APIXABAN SCH MG: 2.5 TABLET, FILM COATED ORAL at 21:08

## 2019-07-17 RX ADMIN — ATORVASTATIN CALCIUM SCH MG: 20 TABLET, FILM COATED ORAL at 09:45

## 2019-07-17 RX ADMIN — DOCUSATE SODIUM SCH MG: 100 CAPSULE, LIQUID FILLED ORAL at 21:08

## 2019-07-17 RX ADMIN — MAGNESIUM HYDROXIDE SCH ML: 400 SUSPENSION ORAL at 21:08

## 2019-07-17 RX ADMIN — CEFAZOLIN SCH MLS/HR: 1 INJECTION, POWDER, FOR SOLUTION INTRAVENOUS at 03:25

## 2019-07-17 RX ADMIN — OXYCODONE HYDROCHLORIDE AND ACETAMINOPHEN PRN TAB: 5; 325 TABLET ORAL at 15:16

## 2019-07-17 RX ADMIN — FLUTICASONE PROPIONATE SCH SPRAY: 50 SPRAY, METERED NASAL at 22:16

## 2019-07-17 RX ADMIN — POTASSIUM CHLORIDE SCH MEQ: 1500 TABLET, EXTENDED RELEASE ORAL at 21:08

## 2019-07-17 RX ADMIN — SOLIFENACIN SUCCINATE SCH: 5 TABLET, FILM COATED ORAL at 15:15

## 2019-07-17 RX ADMIN — DOCUSATE SODIUM SCH MG: 100 CAPSULE, LIQUID FILLED ORAL at 09:45

## 2019-07-17 RX ADMIN — POTASSIUM CHLORIDE SCH MEQ: 1500 TABLET, EXTENDED RELEASE ORAL at 15:17

## 2019-07-17 RX ADMIN — OXYCODONE HYDROCHLORIDE AND ACETAMINOPHEN PRN TAB: 5; 325 TABLET ORAL at 09:42

## 2019-07-17 RX ADMIN — POTASSIUM CHLORIDE SCH MEQ: 1500 TABLET, EXTENDED RELEASE ORAL at 09:47

## 2019-07-17 NOTE — PN
Progress Note





- Progress Note


Date of Service: 07/17/19


SOAP: 


Subjective:


[]Pt seen and examined at bedside. She fees well and denies any chest pain, 

shortness of breath, dizziness or nausea. 








Objective:


[]Gen: NAD


LLE: Dressing CDI, thigh soft, DF/PF intact, DP2+, sensation intact to light 

touch distally


Calves supple and nontender without erythema, edema or palpable cords 








Assessment:


[]POD 1 sp LTK








Plan:


[]WBAT


PT/OT


eliquis 2.5 mg po BID 


Potassium 3.0, replacement ordered this morning by Dr Gifford 





 Vital Signs











Temp  98.9 F   07/17/19 07:27


 


Pulse  85   07/17/19 07:27


 


Resp  18   07/17/19 09:42


 


BP  127/56   07/17/19 07:27


 


Pulse Ox  90   07/17/19 07:27








 Intake & Output











 07/16/19 07/17/19 07/17/19





 18:59 06:59 18:59


 


Intake Total 2860 2470 360


 


Output Total 1475 1100 400


 


Balance 1385 1370 -40


 


Weight 255 lb  


 


Intake:   


 


  IV Fluids 2000 950 


 


    LR 2000 950 


 


  Oral 860 1520 360


 


Output:   


 


  Urine   400


 


  Boss 1225 1100 


 


  Estimated Blood Loss 250  








 Laboratory Last Values











Hgb  12.0 g/dL (12.0-16.0)   07/17/19  06:38    


 


Hct  35 % (35-47)   07/17/19  06:38    


 


Plt Count  196 10^3/uL (150-450)   07/17/19  06:38    


 


MPV  7.7 fL (7.4-10.4)   07/17/19  06:38    


 


Sodium  136 mmol/L (135-145)   07/17/19  06:38    


 


Potassium  3.0 mmol/L (3.5-5.0)  L  07/17/19  06:38    


 


Chloride  97 mmol/L (101-111)  L  07/17/19  06:38    


 


Carbon Dioxide  29 mmol/L (22-32)   07/17/19  06:38    


 


Anion Gap  10 mmol/L (2-11)   07/17/19  06:38    


 


BUN  13 mg/dL (6-24)   07/17/19  06:38    


 


Creatinine  0.51 mg/dL (0.51-0.95)   07/17/19  06:38    


 


Est GFR ( Amer)  144.7  (>60)   07/17/19  06:38    


 


Est GFR (Non-Af Amer)  119.6  (>60)   07/17/19  06:38    


 


BUN/Creatinine Ratio  25.5  (8-20)  H  07/17/19  06:38    


 


Glucose  137 mg/dL ()  H  07/17/19  06:38    


 


Calcium  8.8 mg/dL (8.6-10.3)   07/17/19  06:38

## 2019-07-17 NOTE — PN
Subjective


Date of Service: 07/17/19


Interval History: 





seen this morning, no distress.  she had occasional pvc's 


Social History: Unchanged from Admission


Past Medical History: Unchanged from Admission





Objective


Active Medications: 








Acetaminophen (Tylenol Tab*)  975 mg PO Q8H PRN


   PRN Reason: PAIN


[facility]od[facility] ([facility]*)  [facility]


   Last Admin: 07/17/19 09:44 Dose:  10 mg


Apixaban (Eliquis*)  2.5 mg PO BID [facility]


   Last Admin: 07/17/19 09:45 Dose:  2.5 mg


Atorvastatin Calcium (Lipitor*)  20 mg PO [facility]


   Last Admin: 07/17/19 09:45 Dose:  20 mg


Bisacodyl (Dulcolax Supp*)  10 mg [place] DAILY PRN


   PRN Reason: constipation


Cetirizine HCl (Zyrtec*)  10 mg PO DAILY PRN


   PRN Reason: CONGESTION


Cyclobenzaprine HCl (Flexeril Tab*)  5 mg PO TID PRN


   PRN Reason: SPASMS


Diphenhydramine HCl (Benadryl Iv*)  12.5 mg IV Q6H PRN


   PRN Reason: PRURITIS


Diphenhydramine HCl (Benadryl Liq*)  12.5 mg PO Q6H PRN


   PRN Reason: itching


Diphenhydramine HCl (Benadryl Po*)  25 mg PO Q6H PRN


   PRN Reason: itching


Docusate Sodium (Colace Cap*)  100 mg PO BID [facility]


   Last Admin: 07/17/19 09:45 Dose:  100 mg


Fluticasone Propionate (Flonase Nasal Spray 50mcg*)  2 spray BOTH NARES BEDTIME 

[facility]


   Last Admin: 07/16/19 22:36 Dose:  2 spray


Hydrocortisone (Hytone Cream 1%*)  1 applic TOPICAL [facility]


Lactated Ringer's (Lactated Ringers 1000 Ml Bag*)  1,000 mls @ 100 mls/hr IV 

PER RATE [facility]


   Last Admin: 07/17/19 02:34 Dose:  100 mls/hr


Lactulose (Lactulose*)  30 ml PO Q6H PRN


   PRN Reason: constipation


Losartan Potassium (Cozaar Tab*)  100 mg PO [facility]


   Last Admin: 07/17/19 09:46 Dose:  100 mg


Magnesium Hydroxide (Milk Of Magnesia Liq*)  30 ml PO BID [facility]


   Last Admin: 07/17/19 09:46 Dose:  30 ml


Magnesium Hydroxide (Milk Of Magnesia Liq*)  30 ml PO Q6H PRN


   PRN Reason: constipation


Morphine Sulfate (Morphine Inj (Syringe))*)  2 mg IV Q2H PRN


   PRN Reason: PAIN - UNRELIEVED


Nft: Potassium [ (Potassium] 99 Mg))  99 mg PO 1200 [facility]


   Last Admin: 07/17/19 11:24 Dose:  Not Given


Ondansetron HCl (Zofran Inj*)  4 mg IV Q6H PRN


   PRN Reason: nausea


Ondansetron HCl (Zofran Odt Tab*)  4 mg PO Q6H PRN


   PRN Reason: NAUSEA


Oxycodone HCl (Roxycodone Tab*)  10 mg PO Q4H PRN


   PRN Reason: PAIN - SEVERE


   Last Admin: 07/17/19 11:24 Dose:  10 mg


Oxycodone/Acetaminophen (Percocet 5/325 Tab*)  2 tab PO Q4H PRN


   PRN Reason: PAIN - MODERATE


   Last Admin: 07/17/19 15:16 Dose:  2 tab


Polyethylene Glycol/Electrolytes (Miralax*)  17 gm PO DAILY PRN


   PRN Reason: Constipation


Potassium Chloride (Klor Con Er Tab*)  20 meq PO TID [facility]


   Stop: 07/19/19 09:00


   Last Admin: 07/17/19 15:17 Dose:  20 meq


Solifenacin (Vesicare(Nf))  10 mg PO QAM [facility]


   Last Admin: 07/17/19 15:15 Dose:  Not Given


Temazepam (Restoril Cap*)  15 mg PO BEDTIME PRN


   PRN Reason: INSOMNIA


Tramadol HCl (Ultram*)  50 mg PO Q6H PRN


   PRN Reason: PAIN


   Last Admin: 07/17/19 16:53 Dose:  50 mg








 Vital Signs - 8 hr











  07/17/19 07/17/19 07/17/19





  09:42 11:24 11:27


 


Temperature   98.5 F


 


Pulse Rate   82


 


Respiratory 18 18 14





Rate   


 


Blood Pressure   133/52





(mmHg)   


 


O2 Sat by Pulse   92





Oximetry   














  07/17/19 07/17/19 07/17/19





  15:16 15:46 16:53


 


Temperature  98.1 F 


 


Pulse Rate  86 


 


Respiratory 18 17 18





Rate   


 


Blood Pressure  133/55 





(mmHg)   


 


O2 Sat by Pulse  95 





Oximetry   











Oxygen Devices in Use Now: Nasal Cannula


Appearance: awake pleasant. no distress


Eyes: No Scleral Icterus


Ears/Nose/Mouth/Throat: Mucous Membranes Moist


Neck: NL Appearance and Movements; NL JVP, Trachea Midline


Respiratory: Symmetrical Chest Expansion and Respiratory Effort, Clear to 

Auscultation


Cardiovascular: NL Sounds; No Murmurs; No JVD


Abdominal: NL Sounds; No Tenderness; No Distention


Skin: No Rash or Ulcers


Neurological: Alert and Oriented x 3


Result Diagrams: 


 07/17/19 06:38





 07/17/19 06:38





Assess/Plan/Problems-Billing


Plan By Medical Problem: 





1.  Left knee osteoarthritis s/p left total knee arthroplasty 7/16/19


- Pain control, activity, PT as per ortho





2.  HTN - Continue amlodipine 10 mg daily; keep chlorthalidone 25 mg for today 

as well as her BP was soft this morning.  Will reassess in am and possibly 

resume it.  continue Losartan 100 mg daily





3.  Hyperlipidemia - Continue atorvastatin 20 mg daily





4.  [place] - May use her own CPAP





5.  Hypothyroidism - I don't see it listed on her home meds.   last TSH 6/11/19 

was therapeutic.  This can be deferred for outpatient, it would not be useful 

to check level in the acute setting and post operatively.  Defer to PCP





6. Hypokalemia - supplemented by ortho 20 meq tid.  Will recheck in am





VTE PPX: 


As per ortho currently on Eliquis 2.5 mg bid.








Diet: 


Regular

## 2019-07-18 ENCOUNTER — HOSPITAL ENCOUNTER (INPATIENT)
Dept: HOSPITAL 25 - PMRU | Age: 69
LOS: 6 days | Discharge: HOME HEALTH | DRG: 560 | End: 2019-07-24
Attending: PHYSICAL MEDICINE & REHABILITATION | Admitting: PHYSICAL MEDICINE & REHABILITATION
Payer: MEDICARE

## 2019-07-18 VITALS — SYSTOLIC BLOOD PRESSURE: 158 MMHG | DIASTOLIC BLOOD PRESSURE: 73 MMHG

## 2019-07-18 DIAGNOSIS — Z96.652: ICD-10-CM

## 2019-07-18 DIAGNOSIS — G47.33: ICD-10-CM

## 2019-07-18 DIAGNOSIS — I10: ICD-10-CM

## 2019-07-18 DIAGNOSIS — D72.829: ICD-10-CM

## 2019-07-18 DIAGNOSIS — E83.42: ICD-10-CM

## 2019-07-18 DIAGNOSIS — Z79.899: ICD-10-CM

## 2019-07-18 DIAGNOSIS — E87.6: ICD-10-CM

## 2019-07-18 DIAGNOSIS — Z47.1: Primary | ICD-10-CM

## 2019-07-18 DIAGNOSIS — E66.9: ICD-10-CM

## 2019-07-18 DIAGNOSIS — E80.6: ICD-10-CM

## 2019-07-18 DIAGNOSIS — E78.00: ICD-10-CM

## 2019-07-18 DIAGNOSIS — N32.81: ICD-10-CM

## 2019-07-18 LAB
ANION GAP SERPL CALC-SCNC: 7 MMOL/L (ref 2–11)
BUN SERPL-MCNC: 11 MG/DL (ref 6–24)
BUN/CREAT SERPL: 23.9 (ref 8–20)
CALCIUM SERPL-MCNC: 8.5 MG/DL (ref 8.6–10.3)
CHLORIDE SERPL-SCNC: 97 MMOL/L (ref 101–111)
GLUCOSE SERPL-MCNC: 114 MG/DL (ref 70–100)
HCO3 SERPL-SCNC: 30 MMOL/L (ref 22–32)
HCT VFR BLD AUTO: 34 % (ref 35–47)
HGB BLD-MCNC: 11.6 G/DL (ref 12–16)
MAGNESIUM SERPL-MCNC: 1.8 MG/DL (ref 1.9–2.7)
PLATELET # BLD AUTO: 178 10^3/UL (ref 150–450)
POTASSIUM SERPL-SCNC: 3.1 MMOL/L (ref 3.5–5)
SODIUM SERPL-SCNC: 134 MMOL/L (ref 135–145)

## 2019-07-18 PROCEDURE — F08Z3ZZ FEEDING/EATING TREATMENT: ICD-10-PCS | Performed by: PHYSICAL MEDICINE & REHABILITATION

## 2019-07-18 PROCEDURE — F07Z5ZZ BED MOBILITY TREATMENT: ICD-10-PCS | Performed by: PHYSICAL MEDICINE & REHABILITATION

## 2019-07-18 PROCEDURE — 87086 URINE CULTURE/COLONY COUNT: CPT

## 2019-07-18 PROCEDURE — 80053 COMPREHEN METABOLIC PANEL: CPT

## 2019-07-18 PROCEDURE — 36415 COLL VENOUS BLD VENIPUNCTURE: CPT

## 2019-07-18 PROCEDURE — F08Z1ZZ DRESSING TECHNIQUES TREATMENT: ICD-10-PCS | Performed by: PHYSICAL MEDICINE & REHABILITATION

## 2019-07-18 PROCEDURE — 81015 MICROSCOPIC EXAM OF URINE: CPT

## 2019-07-18 PROCEDURE — 85025 COMPLETE CBC W/AUTO DIFF WBC: CPT

## 2019-07-18 PROCEDURE — F07Z9ZZ GAIT TRAINING/FUNCTIONAL AMBULATION TREATMENT: ICD-10-PCS | Performed by: PHYSICAL MEDICINE & REHABILITATION

## 2019-07-18 PROCEDURE — 86803 HEPATITIS C AB TEST: CPT

## 2019-07-18 PROCEDURE — 83735 ASSAY OF MAGNESIUM: CPT

## 2019-07-18 PROCEDURE — F08Z0ZZ BATHING/SHOWERING TECHNIQUES TREATMENT: ICD-10-PCS | Performed by: PHYSICAL MEDICINE & REHABILITATION

## 2019-07-18 PROCEDURE — F07Z8ZZ TRANSFER TRAINING TREATMENT: ICD-10-PCS | Performed by: PHYSICAL MEDICINE & REHABILITATION

## 2019-07-18 PROCEDURE — 81003 URINALYSIS AUTO W/O SCOPE: CPT

## 2019-07-18 PROCEDURE — 80048 BASIC METABOLIC PNL TOTAL CA: CPT

## 2019-07-18 RX ADMIN — ATORVASTATIN CALCIUM SCH MG: 20 TABLET, FILM COATED ORAL at 09:10

## 2019-07-18 RX ADMIN — CALCIUM CARBONATE (ANTACID) CHEW TAB 500 MG PRN MG: 500 CHEW TAB at 16:04

## 2019-07-18 RX ADMIN — APIXABAN SCH MG: 2.5 TABLET, FILM COATED ORAL at 21:34

## 2019-07-18 RX ADMIN — SOLIFENACIN SUCCINATE SCH MG: 5 TABLET, FILM COATED ORAL at 10:16

## 2019-07-18 RX ADMIN — TRAMADOL HYDROCHLORIDE PRN MG: 50 TABLET, FILM COATED ORAL at 10:47

## 2019-07-18 RX ADMIN — OXYCODONE HYDROCHLORIDE AND ACETAMINOPHEN PRN TAB: 5; 325 TABLET ORAL at 09:01

## 2019-07-18 RX ADMIN — DOCUSATE SODIUM SCH MG: 100 CAPSULE, LIQUID FILLED ORAL at 09:11

## 2019-07-18 RX ADMIN — MAGNESIUM HYDROXIDE SCH ML: 400 SUSPENSION ORAL at 09:12

## 2019-07-18 RX ADMIN — POTASSIUM CHLORIDE SCH MEQ: 1500 TABLET, EXTENDED RELEASE ORAL at 09:12

## 2019-07-18 RX ADMIN — TRAMADOL HYDROCHLORIDE PRN MG: 50 TABLET, FILM COATED ORAL at 04:30

## 2019-07-18 RX ADMIN — LOSARTAN POTASSIUM SCH MG: 25 TABLET, FILM COATED ORAL at 09:11

## 2019-07-18 RX ADMIN — SENNOSIDES SCH TAB: 8.6 TABLET, FILM COATED ORAL at 21:33

## 2019-07-18 RX ADMIN — DOCUSATE SODIUM SCH MG: 100 CAPSULE, LIQUID FILLED ORAL at 21:33

## 2019-07-18 RX ADMIN — OXYCODONE HYDROCHLORIDE AND ACETAMINOPHEN PRN TAB: 5; 325 TABLET ORAL at 17:08

## 2019-07-18 RX ADMIN — OXYCODONE HYDROCHLORIDE AND ACETAMINOPHEN PRN TAB: 5; 325 TABLET ORAL at 21:34

## 2019-07-18 RX ADMIN — AMLODIPINE BESYLATE SCH MG: 5 TABLET ORAL at 09:08

## 2019-07-18 RX ADMIN — APIXABAN SCH MG: 2.5 TABLET, FILM COATED ORAL at 09:09

## 2019-07-18 RX ADMIN — FLUTICASONE PROPIONATE SCH SPRAY: 50 SPRAY, METERED NASAL at 21:34

## 2019-07-18 NOTE — DS
Orthopedic Discharge Summary





- Discharge Summary





Date of Admission:19


Date of Discharge: 19


Date of Surgery: 19


Attending Orthopedic Provider: Dr Gifford


Pre-operative Diagnosis: Left knee osteoarthritis


Operative Procedure: left total knee replacement


Disposition of Patient: pmru


Condition of Patient: stable


History: SANTOS GARCIA is a 68 year old F with years of increasingly severe 

left knee pain. Patient has failed conservative management and has elected to 

undergo a left total knee replacement


Hospital Course: SANTOS was admitted to [facility] on 19. 

Patient underwent a left total knee replacement without complication followed 

by a brief recovery in PACU and transfer to the Short Stay Surgical Unit in 

stable condition. Our hospitalist service, physical therapy and occupational 

therapy also participated in this patients care. Post-op day 1: patient was 

alert and in no acute distress. Dressing was clean, dry and intact. Operative 

extremity dorsiflexion and plantarflexion intact, sensation intact to light 

touch distally, DP2+. Post-op day two: dressing was changed, incision was clean

, dry and intact. Patient felt well without chest pain, shortness of breath, 

dizziness or nausea. Calves were supple and nontender. Patient was deemed to be 

medically and orthopedically stable for discharge to PMRU. Physical therapy 

goals were met. Her potassium was low during her stay and required replacement, 

it should be monitored and replaced appropriately while in [facility]RU.





 Home Medications











 Medication  Instructions  Recorded  Confirmed  Type


 


Atorvastatin* Lipitor 20 MG* 20 mg PO QAM 13 History


 


Biotin 5 mg PO 1200 18 History


 


Chlorthalidone TAB* [Hygroton TAB*] 25 mg PO QAM 18 History


 


Hydrocortisone [Proctozone-Hc] 2.5 % TOPICAL QAM 18 History


 


Losartan TAB* [Cozaar TAB*] 100 mg PO QAM 18 History


 


Mometasone NASAL (NF) [Nasonex 2 spray BOTH NARES BEDTIME 18 

History





(NF)]    


 


Multivitamins/Minerals TAB* 1 tab PO 1200 18 History





[Theragran/minerals TAB*]    


 


Potassium 99 mg PO 1200 18 History


 


Solifenacin(NF) [Vesicare(NF)] 10 mg PO QAM 18 History


 


Tumeric 99 mg PO 1200 18 History


 


Acetaminophen [Acetaminophen ER] 1,000 mg PO DAILY PRN 19 History


 


Amlodipine Besylate [Norvasc] 10 mg PO QAM 19 History


 


Loratadine 10 mg PO DAILY PRN 19 History


 


Metronidazole (TOPICAL)(NF) 1 dose TOPICAL BEDTIME 19 History





[Metrocream (NF)]    


 


Sennosides [Senna] 2 cap PO QPM 19 History


 


Acetaminophen TAB* [Tylenol TAB*] 975 mg PO Q8H PRN  tab 19  Rx


 


Apixaban* [Eliquis*] 2.5 mg PO BID  tab 19  Rx


 


Docusate CAP* [Colace Cap*] 100 mg PO BID  cap 19  Rx


 


Potassium Chlor TAB* [Potassium 20 meq PO TID  tab.er 19  Rx





Chlor TAB 20 MEQ*]    


 


oxyCODONE/Acetamin 5/325 MG* 2 tab PO Q4H PRN  tab 19  Rx





[Percocet 5/325 TAB*]    








DC to PMRU


Discharge Instructions following Orthopedic Surgery:





Activity:  


* Weight Bearing as tolerated


* Continue physical therapy and occupational therapy exercises as shown





Wound care: 


* OK to shower on post-op day 3, no bathing, swimming, or submerging wound. 


* Use gentle soap, pat dry. Cover with gauze, ACE wrap or tape.


* PMRU nurse to do wound checks.





Call Orthopedic office for: 


* Increased drainage


* Redness


* Increased pain


* Fever 





***Go to ER with shortness of breath or chest pain.***





Diet: 


* Regular diet


* Increase fluids and fiber to prevent constipation. 


* Continue to use stool softeners, call office if no bowel motion within 48 

hours.








Medications


See Home Medication List in your packet for medications that you should take 

after discharge.





DVT Prophylaxis: This medication increases bleeding tendency





   Eliquis Dosin.5 mg, 1 tab every 12 hours x 30 days post op








Pain Control:





   Percocet Dosin/325 mg 1-2 tabs by mouth every 4-6 hours as needed for 

pain. Maximum of 10 tabs per day.  Wean off as soon as pain allows. Hold for 

sedation





**Please note that Percocet contains Tylenol (acetaminophen). Maximum daily 

dose of Tylenol is 4000 mg from all sources.**





Antibiotics are required prior to any dental work.





Your potassium was low during your stay, it should be monitored while in PMRU 

and replaced appropriately








FOLLOW UP: Follow up with  [Balta] Within 10-14 days, call for appointment





Please call our office with any questions or concerns ([phone])

## 2019-07-18 NOTE — PN
Subjective


Date of Service: 07/18/19


Interval History: 





patient seen this morning, she was expressing increase pain in her leg.  

otherwise no events overnight.  Her potassium was low still this morning 

despite being on potassium 20 meq tid. 


Past Medical History: Unchanged from Admission





Objective


 Vital Signs - 8 hr











  07/18/19 07/18/19 07/18/19





  07:39 08:00 09:01


 


Temperature 98.1 F  


 


Pulse Rate 99  


 


Respiratory 16 18 18





Rate   


 


Blood Pressure 158/73  





(mmHg)   


 


O2 Sat by Pulse 97 97 





Oximetry   














  07/18/19





  10:47


 


Temperature 


 


Pulse Rate 


 


Respiratory 18





Rate 


 


Blood Pressure 





(mmHg) 


 


O2 Sat by Pulse 





Oximetry 











Oxygen Devices in Use Now: None


Appearance: Awake, alert. no acute distress


Eyes: No Scleral Icterus


Ears/Nose/Mouth/Throat: NL Teeth, Lips, Gums, Mucous Membranes Moist


Neck: NL Appearance and Movements; NL JVP, Trachea Midline


Respiratory: Symmetrical Chest Expansion and Respiratory Effort, Clear to 

Auscultation


Cardiovascular: NL Sounds; No Murmurs; No JVD, - - left lower leg in dressing, 

intact.


Neurological: Alert and Oriented x 3


Result Diagrams: 


 07/18/19 05:23





 07/18/19 05:23





Assess/Plan/Problems-Billing


Plan By Medical Problem: 





1.  Left knee osteoarthritis s/p left total knee arthroplasty 7/16/19


- Pain control, activity, PT as per ortho





2.  HTN - Continue amlodipine 10 mg daily; resume chlorthalidone 25 mg for 

today.  continue Losartan 100 mg daily.  She will need to have potassium 

supplemented as outpatient 





3.  Hyperlipidemia - Continue atorvastatin 20 mg daily





4.  [place] - May use her own CPAP





5.  Hypothyroidism - I don't see it listed on her home meds.   last TSH 6/11/19 

was therapeutic.  This can be deferred for outpatient, it would not be useful 

to check level in the acute setting and post operatively.  Defer to PCP





6. Hypokalemia - supplemented by ortho 20 meq tid.  recheck today level still 

low.  I did order 40 meq 11 am and repeat BMP at 1 pm.





VTE PPX: 


As per ortho currently on Eliquis 2.5 mg bid.








Diet: 


Regular

## 2019-07-19 LAB
ALBUMIN SERPL BCG-MCNC: 3.7 G/DL (ref 3.2–5.2)
ALBUMIN/GLOB SERPL: 1.2 {RATIO} (ref 1–3)
ALP SERPL-CCNC: 69 U/L (ref 34–104)
ALT SERPL W P-5'-P-CCNC: 11 U/L (ref 7–52)
ANION GAP SERPL CALC-SCNC: 10 MMOL/L (ref 2–11)
AST SERPL-CCNC: 15 U/L (ref 13–39)
BASOPHILS # BLD AUTO: 0.1 10^3/UL (ref 0–0.2)
BUN SERPL-MCNC: 11 MG/DL (ref 6–24)
BUN/CREAT SERPL: 23.9 (ref 8–20)
CALCIUM SERPL-MCNC: 8.9 MG/DL (ref 8.6–10.3)
CHLORIDE SERPL-SCNC: 96 MMOL/L (ref 101–111)
EOSINOPHIL # BLD AUTO: 0.1 10^3/UL (ref 0–0.6)
GLOBULIN SER CALC-MCNC: 3 G/DL (ref 2–4)
GLUCOSE SERPL-MCNC: 119 MG/DL (ref 70–100)
HCO3 SERPL-SCNC: 28 MMOL/L (ref 22–32)
HCT VFR BLD AUTO: 37 % (ref 35–47)
HGB BLD-MCNC: 12.3 G/DL (ref 12–16)
LYMPHOCYTES # BLD AUTO: 1.8 10^3/UL (ref 1–4.8)
MCH RBC QN AUTO: 26 PG (ref 27–31)
MCHC RBC AUTO-ENTMCNC: 33 G/DL (ref 31–36)
MCV RBC AUTO: 80 FL (ref 80–97)
MONOCYTES # BLD AUTO: 1.2 10^3/UL (ref 0–0.8)
NEUTROPHILS # BLD AUTO: 10 10^3/UL (ref 1.5–7.7)
NRBC # BLD AUTO: 0 10^3/UL
NRBC BLD QL AUTO: 0
PLATELET # BLD AUTO: 218 10^3/UL (ref 150–450)
POTASSIUM SERPL-SCNC: 3.3 MMOL/L (ref 3.5–5)
PROT SERPL-MCNC: 6.7 G/DL (ref 6.4–8.9)
RBC # BLD AUTO: 4.68 10^6 /UL (ref 3.7–4.87)
RBC UR QL AUTO: (no result)
SODIUM SERPL-SCNC: 134 MMOL/L (ref 135–145)
WBC # BLD AUTO: 13.2 10^3/UL (ref 3.5–10.8)
WBC UR QL AUTO: (no result)

## 2019-07-19 RX ADMIN — CHLORTHALIDONE SCH MG: 50 TABLET ORAL at 09:14

## 2019-07-19 RX ADMIN — DOCUSATE SODIUM SCH MG: 100 CAPSULE, LIQUID FILLED ORAL at 09:14

## 2019-07-19 RX ADMIN — AMLODIPINE BESYLATE SCH MG: 5 TABLET ORAL at 09:15

## 2019-07-19 RX ADMIN — SENNOSIDES SCH TAB: 8.6 TABLET, FILM COATED ORAL at 22:01

## 2019-07-19 RX ADMIN — OXYCODONE HYDROCHLORIDE AND ACETAMINOPHEN PRN TAB: 5; 325 TABLET ORAL at 07:19

## 2019-07-19 RX ADMIN — POTASSIUM CHLORIDE SCH MEQ: 1500 TABLET, EXTENDED RELEASE ORAL at 14:51

## 2019-07-19 RX ADMIN — OXYCODONE HYDROCHLORIDE AND ACETAMINOPHEN PRN TAB: 5; 325 TABLET ORAL at 11:29

## 2019-07-19 RX ADMIN — DOCUSATE SODIUM SCH MG: 100 CAPSULE, LIQUID FILLED ORAL at 22:01

## 2019-07-19 RX ADMIN — OXYCODONE HYDROCHLORIDE AND ACETAMINOPHEN PRN TAB: 5; 325 TABLET ORAL at 22:03

## 2019-07-19 RX ADMIN — APIXABAN SCH MG: 2.5 TABLET, FILM COATED ORAL at 22:01

## 2019-07-19 RX ADMIN — APIXABAN SCH MG: 2.5 TABLET, FILM COATED ORAL at 09:14

## 2019-07-19 RX ADMIN — CALCIUM CARBONATE (ANTACID) CHEW TAB 500 MG PRN MG: 500 CHEW TAB at 07:19

## 2019-07-19 RX ADMIN — ATORVASTATIN CALCIUM SCH MG: 20 TABLET, FILM COATED ORAL at 17:28

## 2019-07-19 RX ADMIN — MAGNESIUM OXIDE TAB 400 MG (241.3 MG ELEMENTAL MG) SCH MG: 400 (241.3 MG) TAB at 12:36

## 2019-07-19 RX ADMIN — SOLIFENACIN SUCCINATE SCH: 5 TABLET, FILM COATED ORAL at 15:01

## 2019-07-19 RX ADMIN — LOSARTAN POTASSIUM SCH MG: 25 TABLET, FILM COATED ORAL at 09:15

## 2019-07-19 RX ADMIN — OXYCODONE HYDROCHLORIDE AND ACETAMINOPHEN PRN TAB: 5; 325 TABLET ORAL at 17:28

## 2019-07-19 RX ADMIN — FLUTICASONE PROPIONATE SCH SPRAY: 50 SPRAY, METERED NASAL at 22:01

## 2019-07-19 RX ADMIN — POTASSIUM CHLORIDE SCH MEQ: 1500 TABLET, EXTENDED RELEASE ORAL at 22:02

## 2019-07-19 NOTE — PMRUTEAM
PMRU: Team Meeting


Current Status: 


 Nursing: Current Status











Skin Deviations [Left Knee]    Incision











 Physical Therapy: Current Status











Bed Mobility Assistance        Supervision


 


Transfer Mobility Assistance   Contact Guard Assist


 


Transfer/Bed Mobility          Rolling Walker





Recommended Devices            


 


Ambulation Assistance          Contact Guard Assist


 


Ambulation Assistive Devices   Rolling Walker


 


Number of Feet Patient         80





Ambulated                      


 


Stairs Assistance              Min Assist


 


Stairs Recommended Devices     Two Rails


 


Number of Stairs               1














 Occupational Therapy: Current Status











Upper Body Dressing            Supervision


 


Lower Body Dressing            Supervision


 


Bathing                        Min assist


 


Toileting                      Contact Guard Assist,Min Assist


 


Toilet Transfer                Contact Guard Assist


 


Shower Transfer                Contact Guard Assist


 


Eating                         Independent














 Rec Therapy: Current Status











Summary of Assessment and      Pt. was open to conversation - very cooperative





Clinical Impression            and engaged throughout.  Pt. identified with





 numerous interests and active involvement in them





 prior to admission.  Pt. states she enjoys her





 life.  Pt. was interested in pet therapy and





 continued leisure visits.  Pt. brought her





 knitting and books from home to engage in during





 free time.  Pt. asked writer to call her 





 and inform him of her room change - called and





 left message as she instructed.


 


Treatment Goals                Pt. will engage in leisure activities while on 

the





 unit.


 


Treatment Plan                 Provide recreation therapy and encourage active





 involvement.














 Social Work: Current Status











Discharge Plan                 return home with home care svs and support from





 friends


 


Potential for Family Training  n/a pt lives alone


 


Anticipated Discharge          Home





Destination                    


 


Discharge With                 home care svs and support from friends

















Goals: 


 Physical Therapy: Initial Goals











Bed Mobility Assistance        Independent


 


Transfer Mobility Assistance   Independent


 


Transfer/Bed Mobility          Rolling Walker





Recommended Devices            


 


Ambulation                     Independent


 


Ambulation Recommended Devices Rolling Walker


 


Ambulation Distance            150


 


Stairs Assistance              Independent


 


Stair Recommended Devices      Two Rails


 


Number of Stairs               5


 


Home Exercise Program          Independent





Assistance                     














 Occupational Therapy: Initial Goals











Goals to be Completed in (Days 5-7





)                              


 


Upper Body Bathing Routine     Modified Independent with


 


Lower Body Bathing Routine     Modified Independent with


 


Upper Body Dressing Routine    Modified Independent with


 


Lower Body Dressing Routine    Modified Independent with


 


Toilet Hygeine and Clothing    Modified Independent with





Management Routine             


 


Toilet Transfer Routine        Modified Independent with


 


Tub Transfer Routine           Modified Independent with


 


Tub Trasnfer Assistive Devices +TTB


 


Functional Transfers for ADL   Modified Independent with


 


Grooming Routine               Independent,Modified Independent with


 


Feeding Routine                Independent


 


Light Housekeeping Tasks       Minimal Contact Assist














 Social Work: Goals











Discharge Plan                 return home with home care svs and support from





 friends


 


Potential for Family Training  n/a pt lives alone


 


Anticipated Discharge          Home





Destination                    


 


Discharge With                 home care svs and support from friends

















Care Plan: 


 Care Plan





ADL's - Improve/Maintain                Start:  07/18/19 14:12              


Freq:   DAILY                           Status: Active      Target:         


Protocol:                                                                   








Activity Type Activity Date Activity User E-Sign Co-Sign Detail





Recorded Client Recorded Date Recorded By   


 


Document 07/18/19 14:12 [id number]   





PMRU-C09 07/18/19 14:12 [id number]   














  07/18/19





  14:12


 


PMRU Outcome: ADL's/ADL Transfers 


 


Orders/Interventions Occupational





 Therapy





 Evaluation &





 Treatment





 Communication





 Tool in Patient





 Room


 


Device Yes


 


Address Deficits Secondary To: L TKA


 


Patient to receive OT 5x/wk for  Therex





min/day Self Care





 Management





 Group Therapy


 


UE/LE ADL's with Assist Yes: Du


 


ADL Transfers with Assist Yes: Du


 


Toileting: Transfers,Clothing Management Yes: Du





,Hygeine w/Assist 


 


Light Kitchen/Laundry w/Assist Yes: light meal





 prep Du


 


Progression Toward Outcome/Goals Progressing


 


Outcome/Goals Met Pt is a 69 y/o





 female s/p L





 TKA presenting





 with increased





 pain, limited





 LLE ROM,





 decreased





 endurance and





 balance





 deficits





 affecting





 bathing,





 toileting and





 functional





 transfers. Pt





 reports she has





 been





 practicing with





 all her AE PTA





 . Pt will





 benefit from





 skilled OT





 services to





 maximize





 independence





 and safety.








DVT Prophylaxis- Improve/Maintain       Start:  07/19/19 02:15              


Freq:   QSHIFT                          Status: Active      Target:         


Protocol:                                                                   








Activity Type Activity Date Activity User E-Sign Co-Sign Detail





Recorded Client Recorded Date Recorded By   


 


Document 07/19/19 02:15 [id number]   





PMRU-C03 07/19/19 02:17 [id number]   














  07/19/19





  02:15


 


PMRU Outcome: DVT Prophylaxis 


 


Outcome/Goals Remains Free of





 DVT





 Complies with





 DVT Prophylaxis





 /Treatment





 Demonstrates





 Knowledge of





 DVT Prevention/





 Treatment





 TEDS Stockings





 on Every AM,





 Off at HS


 


Progression Toward Outcome/Goals Progressing








Discharge Planning - Improve/Maintain   Start:  07/19/19 02:15              


Freq:   DAILY                           Status: Active      Target:         


Protocol:                                                                   








Activity Type Activity Date Activity User E-Sign Co-Sign Detail





Recorded Client Recorded Date Recorded By   


 


Document 07/19/19 02:15 [id number]   





PMRU-C03 07/19/19 02:17 [id number]   














  07/19/19





  02:15


 


PMRU Outcome: Discharge Planning 


 


Update Patient Family No


 


Outcome/Goals Demonstrates





 Understanding





 of Discharge





 Plan


 


Progression Toward Outcome/Goals Progressing








Education-Improve/Maintain              Start:  07/19/19 02:15              


Freq:   QSHIFT                          Status: Active      Target:         


Protocol:                                                                   








Activity Type Activity Date Activity User E-Sign Co-Sign Detail





Recorded Client Recorded Date Recorded By   


 


Document 07/19/19 02:15 [id number]   





PMRU-C03 07/19/19 02:17 [id number]   














  07/19/19





  02:15


 


PMRU Outcome: Education 


 


Outcome/Goals Demonstrate/





 Verbalize





 Understanding





 of Written





 Discharge





 Instructions





 Demonstrates





 Skills





 Encourage





 Questions


 


Progression Toward Outcome/Goals Progressing








/GI-Improve/Maintain                  Start:  07/19/19 02:15              


Freq:   QSHIFT                          Status: Active      Target:         


Protocol:                                                                   








Activity Type Activity Date Activity User E-Sign Co-Sign Detail





Recorded Client Recorded Date Recorded By   


 


Document 07/19/19 02:15 [id number]   





PMRU-C03 07/19/19 02:17 [id number]   














  07/19/19





  02:15


 


PMRU Outcome: Genitourinary/ 





Gastrointestinal 


 


Genitourinary- Outcome/Goals Maintain/





 Achieve





 Adequate





 Urinary Output





 Remain Free of





 Hospital-





 Acquired UTI


 


Gastrointestinal-Outcome/Goals Maintain/





 Achieve Bowel





 Regularity in





 Accordance with





 Pt's Baseline





 Prevent





 Constipation


 


Progression Toward Outcome/Goals -  Progressing


 


Progression Toward Outcome/Goals - GI Progressing








Medication Administration               Start:  07/19/19 02:15              


Freq:   QSHIFT                          Status: Active      Target:         


Protocol:                                                                   








Activity Type Activity Date Activity User E-Sign Co-Sign Detail





Recorded Client Recorded Date Recorded By   


 


Document 07/19/19 02:15 [id number]   





PMRU-C03 07/19/19 02:17 [id number]   














  07/19/19





  02:15


 


PMRU Outcome: Medication Administration 


 


Assess Patient Knowledge/Teach Med No





Education for all Meds 


 


Outcome/Goals Patient





 Independent





 with Medication





 Administration





 at Home





 Demonstrates





 Understanding


 


Progression Towards Outcome/Goals Progressing


 


Is Patient Going Home on Lovenox? No














Medicine Note: 








Length of Stay:  [5 days]





Anticipated Discharge Destination: Home





Tentative Discharge Date:  [7/24/19]





Discharged to:  [home]

## 2019-07-19 NOTE — PN
Progress Note


Date of Service: 07/19/19


Note: 


SANTOS GARCIA was visited. Nursing and therapy notes read and reviewed. No 

chest pain, shortness of breath or abdominal pain. She does feel some mild 

nausea and sinus pressure.  She would like to restart her home dose of 

vesicare.  Denies dysuria or cough.








Current Medications: 


 Active Medications











Generic Name Dose Route Start Last Admin





  Trade Name Freq  PRN Reason Stop Dose Admin


 


Acetaminophen  650 mg  07/18/19 11:58  





  Tylenol Tab*  PO   





  Q6H PRN   





  FEVER/PAIN   





     





     





     


 


Amlodipine Besylate  10 mg  07/19/19 09:00  07/19/19 09:15





  Norvasc Tab*  PO   10 mg





  DAILY ALEKS   Administration





     





     





     





     


 


Apixaban  2.5 mg  07/18/19 21:00  07/19/19 09:14





  Eliquis*  PO   2.5 mg





  BID ALEKS   Administration





     





     





     





     


 


Atorvastatin Calcium  20 mg  07/19/19 17:00  





  Lipitor*  PO   





  1700 ALEKS   





     





     





     





     


 


Bisacodyl  10 mg  07/18/19 11:58  





  Dulcolax Supp*  MD   





  DAILY PRN   





  CONSTIPATION   





     





     





     


 


Calcium Carbonate  500 mg  07/18/19 12:21  07/19/19 07:19





  Tums*  PO   500 mg





  Q4H PRN   Administration





  DYSPEPSIA   





     





     





     


 


Chlorthalidone  25 mg  07/19/19 09:00  07/19/19 09:14





  Hygroton Tab*  PO   25 mg





  DAILY ALEKS   Administration





     





     





     





     


 


Docusate Sodium  100 mg  07/18/19 21:00  07/19/19 09:14





  Colace Cap*  PO   100 mg





  BID ALEKS   Administration





     





     





     





     


 


Fluticasone Propionate  2 spray  07/18/19 21:00  07/18/19 21:34





  Flonase Nasal Spray 50mcg*  BOTH NARES   2 spray





  2100 ALEKS   Administration





     





     





     





     


 


Lactulose  30 ml  07/18/19 17:45  07/18/19 21:34





  Lactulose*  PO   30 ml





  Q6H PRN   Administration





  CONSTIPATION   





     





     





     


 


Losartan Potassium  100 mg  07/19/19 09:00  07/19/19 09:15





  Cozaar Tab*  PO   100 mg





  DAILY ALEKS   Administration





     





     





     





     


 


Magnesium Oxide  800 mg  07/19/19 10:00  





  Magox 400 Tab*  PO   





  DAILY ALEKS   





     





     





     





     


 


Oxycodone/Acetaminophen  1 tab  07/18/19 12:09  07/18/19 21:34





  Percocet 5/325 Tab*  PO   1 tab





  Q4H PRN   Administration





  PAIN - MODERATE TO SEVERE   





     





     





     


 


Oxycodone/Acetaminophen  2 tab  07/18/19 12:15  07/19/19 07:19





  Percocet 5/325 Tab*  PO   2 tab





  Q4H PRN   Administration





  PAIN - SEVERE   





     





     





     


 


Potassium Chloride  20 meq  07/19/19 14:00  





  Klor Con Er Tab*  PO   





  TID ALEKS   





     





     





     





     


 


Senna  2 tab  07/18/19 21:00  07/18/19 21:33





  Senokot Tab*  PO   2 tab





  BEDTIME ALEKS   Administration





     





     





     





     


 


Solifenacin  10 mg  07/19/19 10:00  





  Vesicare(Nf)  PO   





  DAILY ALEKS   





     





     





     





     











Vital Signs: 


 Vital Signs











Temp Pulse Resp BP Pulse Ox


 


 98.6 F   96   18   151/67   96 


 


 07/19/19 06:02  07/19/19 06:02  07/19/19 09:21  07/19/19 06:02  07/19/19 06:02











Lab Results: 


 Laboratory Results - last 24 hr











  07/19/19 07/19/19 07/19/19





  06:30 06:30 06:30


 


WBC  13.2 H  


 


RBC  4.68  


 


Hgb  12.3  


 


Hct  37  


 


MCV  80  


 


MCH  26 L  


 


MCHC  33  


 


RDW  16 H  


 


Plt Count  218  


 


MPV  7.9  


 


Neut % (Auto)  75.7  


 


Lymph % (Auto)  14.0  


 


Mono % (Auto)  9.0  


 


Eos % (Auto)  0.8  


 


Baso % (Auto)  0.5  


 


Absolute Neuts (auto)  10.0 H  


 


Absolute Lymphs (auto)  1.8  


 


Absolute Monos (auto)  1.2 H  


 


Absolute Eos (auto)  0.1  


 


Absolute Basos (auto)  0.1  


 


Absolute Nucleated RBC  0.0  


 


Nucleated RBC %  0.0  


 


Sodium   134 L 


 


Potassium   3.3 L 


 


Chloride   96 L 


 


Carbon Dioxide   28 


 


Anion Gap   10 


 


BUN   11 


 


Creatinine   0.46 L 


 


Est GFR ( Amer)   163.0 


 


Est GFR (Non-Af Amer)   134.7 


 


BUN/Creatinine Ratio   23.9 H 


 


Glucose   119 H 


 


Calcium   8.9 


 


Total Bilirubin   1.20 H 


 


AST   15 


 


ALT   11 


 


Alkaline Phosphatase   69 


 


Total Protein   6.7 


 


Albumin   3.7 


 


Globulin   3.0 


 


Albumin/Globulin Ratio   1.2 


 


Hepatitis C Antibody    Negative


 


Hepatitis C Ab Index    0.02











Exam: 





GEN: no acute distress. alert and appropriate.


LUNGS: clear to auscultation bilaterally.


CV: regular rate and rhythm


ABD: + bowel sounds, soft, non-tender, non-distended. Negative Cuba's.


EXT: No edema. Knee dressing c/d/i.


NEURO: BLE motor 5/5 except pain limited testing of left hip and knee.  

Sensation intact BLE.





Assessment/Plan: 


69yo woman s/p left total knee replacement secondary to OA


#Left TKR: f/u with Dr. Gifford. Pain control. PT/OT.


#DVT ppx: Eliquis 2.5mg bid


#Hypertension: amlodipine, chlorthalidone, losartan


#Hypokalemia/Hypomagnesemia: replace orally. recheck K and Mg tomorrow.


#Obstructive sleep apnea: CPAP


#Hyperlipidemia: lipitor


#Leukocytosis: check UA. Not febrile and no other clear sign of infection. 

Recheck cbc tomorrow.


#Hyperbilirubinemia: Not symptomatic. f/u next week.  


#Overactive bladder: restart vesicare.


#Advanced Directives: full code


#Estimated LOS: IPOC today.





07/19/19 10:59

## 2019-07-20 LAB
ANION GAP SERPL CALC-SCNC: 10 MMOL/L (ref 2–11)
BASOPHILS # BLD AUTO: 0 10^3/UL (ref 0–0.2)
BUN SERPL-MCNC: 11 MG/DL (ref 6–24)
BUN/CREAT SERPL: 25.6 (ref 8–20)
CALCIUM SERPL-MCNC: 8.9 MG/DL (ref 8.6–10.3)
CHLORIDE SERPL-SCNC: 98 MMOL/L (ref 101–111)
EOSINOPHIL # BLD AUTO: 0.2 10^3/UL (ref 0–0.6)
GLUCOSE SERPL-MCNC: 113 MG/DL (ref 70–100)
HCO3 SERPL-SCNC: 25 MMOL/L (ref 22–32)
HCT VFR BLD AUTO: 37 % (ref 35–47)
HGB BLD-MCNC: 12.5 G/DL (ref 12–16)
LYMPHOCYTES # BLD AUTO: 2 10^3/UL (ref 1–4.8)
MAGNESIUM SERPL-MCNC: 1.7 MG/DL (ref 1.9–2.7)
MCH RBC QN AUTO: 27 PG (ref 27–31)
MCHC RBC AUTO-ENTMCNC: 34 G/DL (ref 31–36)
MCV RBC AUTO: 81 FL (ref 80–97)
MONOCYTES # BLD AUTO: 0.9 10^3/UL (ref 0–0.8)
NEUTROPHILS # BLD AUTO: 7.3 10^3/UL (ref 1.5–7.7)
NRBC # BLD AUTO: 0 10^3/UL
NRBC BLD QL AUTO: 0.1
PLATELET # BLD AUTO: 202 10^3/UL (ref 150–450)
POTASSIUM SERPL-SCNC: 3.6 MMOL/L (ref 3.5–5)
RBC # BLD AUTO: 4.59 10^6 /UL (ref 3.7–4.87)
SODIUM SERPL-SCNC: 133 MMOL/L (ref 135–145)
WBC # BLD AUTO: 10.4 10^3/UL (ref 3.5–10.8)

## 2019-07-20 RX ADMIN — POTASSIUM CHLORIDE SCH MEQ: 1500 TABLET, EXTENDED RELEASE ORAL at 22:21

## 2019-07-20 RX ADMIN — DOCUSATE SODIUM SCH MG: 100 CAPSULE, LIQUID FILLED ORAL at 08:09

## 2019-07-20 RX ADMIN — AMLODIPINE BESYLATE SCH MG: 5 TABLET ORAL at 08:11

## 2019-07-20 RX ADMIN — ATORVASTATIN CALCIUM SCH MG: 20 TABLET, FILM COATED ORAL at 17:13

## 2019-07-20 RX ADMIN — SENNOSIDES SCH TAB: 8.6 TABLET, FILM COATED ORAL at 22:21

## 2019-07-20 RX ADMIN — APIXABAN SCH MG: 2.5 TABLET, FILM COATED ORAL at 08:10

## 2019-07-20 RX ADMIN — SOLIFENACIN SUCCINATE SCH MG: 5 TABLET, FILM COATED ORAL at 08:08

## 2019-07-20 RX ADMIN — LOSARTAN POTASSIUM SCH MG: 25 TABLET, FILM COATED ORAL at 08:11

## 2019-07-20 RX ADMIN — OXYCODONE HYDROCHLORIDE AND ACETAMINOPHEN PRN TAB: 5; 325 TABLET ORAL at 22:21

## 2019-07-20 RX ADMIN — OXYCODONE HYDROCHLORIDE AND ACETAMINOPHEN PRN TAB: 5; 325 TABLET ORAL at 17:13

## 2019-07-20 RX ADMIN — MAGNESIUM OXIDE TAB 400 MG (241.3 MG ELEMENTAL MG) SCH MG: 400 (241.3 MG) TAB at 08:10

## 2019-07-20 RX ADMIN — POTASSIUM CHLORIDE SCH MEQ: 1500 TABLET, EXTENDED RELEASE ORAL at 13:50

## 2019-07-20 RX ADMIN — POTASSIUM CHLORIDE SCH MEQ: 1500 TABLET, EXTENDED RELEASE ORAL at 08:10

## 2019-07-20 RX ADMIN — APIXABAN SCH MG: 2.5 TABLET, FILM COATED ORAL at 22:21

## 2019-07-20 RX ADMIN — DOCUSATE SODIUM SCH MG: 100 CAPSULE, LIQUID FILLED ORAL at 22:21

## 2019-07-20 RX ADMIN — CHLORTHALIDONE SCH MG: 50 TABLET ORAL at 08:07

## 2019-07-20 RX ADMIN — OXYCODONE HYDROCHLORIDE AND ACETAMINOPHEN PRN TAB: 5; 325 TABLET ORAL at 08:06

## 2019-07-20 RX ADMIN — FLUTICASONE PROPIONATE SCH SPRAY: 50 SPRAY, METERED NASAL at 22:22

## 2019-07-20 NOTE — PN
Progress Note


Date of Service: 07/20/19


Note: 


SANTOS GARCIA was visited. Nursing and therapy notes read and reviewed. No 

chest pain, shortness of breath or abdominal pain. Finds she is less nauseous 

if takes percocet with food.  No vomiting.  Loose stools.








Current Medications: 


 Active Medications











Generic Name Dose Route Start Last Admin





  Trade Name Freq  PRN Reason Stop Dose Admin


 


Acetaminophen  650 mg  07/18/19 11:58  





  Tylenol Tab*  PO   





  Q6H PRN   





  FEVER/PAIN   





     





     





     


 


Amlodipine Besylate  10 mg  07/19/19 09:00  07/20/19 08:11





  Norvasc Tab*  PO   10 mg





  DAILY ALEKS   Administration





     





     





     





     


 


Apixaban  2.5 mg  07/18/19 21:00  07/20/19 08:10





  Eliquis*  PO   2.5 mg





  BID ALEKS   Administration





     





     





     





     


 


Atorvastatin Calcium  20 mg  07/19/19 17:00  07/19/19 17:28





  Lipitor*  PO   20 mg





  1700 ALEKS   Administration





     





     





     





     


 


Bisacodyl  10 mg  07/18/19 11:58  





  Dulcolax Supp*  SD   





  DAILY PRN   





  CONSTIPATION   





     





     





     


 


Calcium Carbonate  500 mg  07/18/19 12:21  07/19/19 07:19





  Tums*  PO   500 mg





  Q4H PRN   Administration





  DYSPEPSIA   





     





     





     


 


Chlorthalidone  25 mg  07/19/19 09:00  07/20/19 08:07





  Hygroton Tab*  PO   25 mg





  DAILY ALEKS   Administration





     





     





     





     


 


Docusate Sodium  100 mg  07/18/19 21:00  07/20/19 08:09





  Colace Cap*  PO   100 mg





  BID ALEKS   Administration





     





     





     





     


 


Fluticasone Propionate  2 spray  07/18/19 21:00  07/19/19 22:01





  Flonase Nasal Spray 50mcg*  BOTH NARES   2 spray





  2100 ALEKS   Administration





     





     





     





     


 


Lactulose  30 ml  07/18/19 17:45  07/18/19 21:34





  Lactulose*  PO   30 ml





  Q6H PRN   Administration





  CONSTIPATION   





     





     





     


 


Losartan Potassium  100 mg  07/19/19 09:00  07/20/19 08:11





  Cozaar Tab*  PO   100 mg





  DAILY ALEKS   Administration





     





     





     





     


 


Magnesium Oxide  800 mg  07/19/19 10:00  07/20/19 08:10





  Magox 400 Tab*  PO   800 mg





  DAILY ALEKS   Administration





     





     





     





     


 


Oxycodone/Acetaminophen  1 tab  07/18/19 12:09  07/19/19 17:28





  Percocet 5/325 Tab*  PO   1 tab





  Q4H PRN   Administration





  PAIN - MODERATE TO SEVERE   





     





     





     


 


Oxycodone/Acetaminophen  2 tab  07/18/19 12:15  07/20/19 08:06





  Percocet 5/325 Tab*  PO   2 tab





  Q4H PRN   Administration





  PAIN - SEVERE   





     





     





     


 


Potassium Chloride  20 meq  07/19/19 14:00  07/20/19 08:10





  Klor Con Er Tab*  PO   20 meq





  TID ALEKS   Administration





     





     





     





     


 


Senna  2 tab  07/18/19 21:00  07/19/19 22:01





  Senokot Tab*  PO   2 tab





  BEDTIME ALEKS   Administration





     





     





     





     


 


Solifenacin  10 mg  07/19/19 10:00  07/20/19 08:08





  Vesicare(Nf)  PO   10 mg





  DAILY ALEKS   Administration





     





     





     





     











Vital Signs: 


 Vital Signs











Temp Pulse Resp BP Pulse Ox


 


 98.2 F   84   18   133/55   99 


 


 07/20/19 05:28  07/20/19 05:28  07/20/19 08:06  07/20/19 05:28  07/20/19 05:28











Lab Results: 


 Laboratory Results - last 24 hr











  07/19/19 07/19/19 07/20/19





  06:30 17:00 06:03


 


WBC   


 


RBC   


 


Hgb   


 


Hct   


 


MCV   


 


MCH   


 


MCHC   


 


RDW   


 


Neut % (Auto)   


 


Lymph % (Auto)   


 


Mono % (Auto)   


 


Eos % (Auto)   


 


Baso % (Auto)   


 


Absolute Neuts (auto)   


 


Absolute Lymphs (auto)   


 


Absolute Monos (auto)   


 


Absolute Eos (auto)   


 


Absolute Basos (auto)   


 


Absolute Nucleated RBC   


 


Nucleated RBC %   


 


Sodium    133 L


 


Potassium    3.6


 


Chloride    98 L


 


Carbon Dioxide    25


 


Anion Gap    10


 


BUN    11


 


Creatinine    0.43 L


 


Est GFR ( Amer)    176.2


 


Est GFR (Non-Af Amer)    145.6


 


BUN/Creatinine Ratio    25.6 H


 


Glucose    113 H


 


Calcium    8.9


 


Magnesium    1.7 L


 


Urine Color   Yellow 


 


Urine Appearance   Clear 


 


Urine pH   8.0 


 


Ur Specific Gravity   1.009 L 


 


Urine Protein   Negative 


 


Urine Ketones   Negative 


 


Urine Blood   1+ A 


 


Urine Nitrate   Negative 


 


Urine Bilirubin   Negative 


 


Urine Urobilinogen   Negative 


 


Ur Leukocyte Esterase   Negative 


 


Urine WBC (Auto)   Trace(0-5/hpf) 


 


Urine RBC (Auto)   Trace(0-2/hpf) 


 


Ur Squamous Epith Cells   Present A 


 


Urine Bacteria   Absent 


 


Urine Glucose   Negative 


 


Hepatitis C Antibody  Negative  


 


Hepatitis C Ab Index  0.02  














  07/20/19





  06:03


 


WBC  10.4


 


RBC  4.59


 


Hgb  12.5


 


Hct  37


 


MCV  81


 


MCH  27


 


MCHC  34


 


RDW  16 H


 


Neut % (Auto)  70.0


 


Lymph % (Auto)  19.2


 


Mono % (Auto)  8.7


 


Eos % (Auto)  1.8


 


Baso % (Auto)  0.3


 


Absolute Neuts (auto)  7.3


 


Absolute Lymphs (auto)  2.0


 


Absolute Monos (auto)  0.9 H


 


Absolute Eos (auto)  0.2


 


Absolute Basos (auto)  0.0


 


Absolute Nucleated RBC  0.0


 


Nucleated RBC %  0.1


 


Sodium 


 


Potassium 


 


Chloride 


 


Carbon Dioxide 


 


Anion Gap 


 


BUN 


 


Creatinine 


 


Est GFR ( Amer) 


 


Est GFR (Non-Af Amer) 


 


BUN/Creatinine Ratio 


 


Glucose 


 


Calcium 


 


Magnesium 


 


Urine Color 


 


Urine Appearance 


 


Urine pH 


 


Ur Specific Gravity 


 


Urine Protein 


 


Urine Ketones 


 


Urine Blood 


 


Urine Nitrate 


 


Urine Bilirubin 


 


Urine Urobilinogen 


 


Ur Leukocyte Esterase 


 


Urine WBC (Auto) 


 


Urine RBC (Auto) 


 


Ur Squamous Epith Cells 


 


Urine Bacteria 


 


Urine Glucose 


 


Hepatitis C Antibody 


 


Hepatitis C Ab Index 











Exam: 





GEN: no acute distress. alert and appropriate.


LUNGS: clear to auscultation bilaterally.


CV: regular rate and rhythm


ABD: + bowel sounds, soft, non-tender, non-distended. Negative Cuba's.


EXT: No edema. Left knee incision with sutures c/d/i. Minimal proximal 

erythema.  Ecchymosis mostly medially.


NEURO: BLE motor 5/5 except pain limited testing of left hip and knee.  

Sensation intact BLE.











Assessment/Plan: 


67yo woman s/p left total knee replacement secondary to OA


#Left TKR: f/u with Dr. Gifford. Pain control. PT/OT.


#DVT ppx: Eliquis 2.5mg bid


#Hypertension: amlodipine, chlorthalidone, losartan


#Hypokalemia/Hypomagnesemia: K is better but Mg still low. continue supplements 

and recheck on Monday. MgOx may contribute to diarrhea.


#Obstructive sleep apnea: CPAP


#Hyperlipidemia: lipitor


#Leukocytosis: Resolved. UA not convincing for UTI. Not febrile and no other 

clear sign of infection.


#Hyperbilirubinemia: Not symptomatic. f/u Monday. 


#Overactive bladder: vesicare.


#Advanced Directives: full code


#Estimated LOS: 7/24/19 07/20/19 10:03

## 2019-07-21 RX ADMIN — SOLIFENACIN SUCCINATE SCH MG: 5 TABLET, FILM COATED ORAL at 09:34

## 2019-07-21 RX ADMIN — APIXABAN SCH MG: 2.5 TABLET, FILM COATED ORAL at 09:36

## 2019-07-21 RX ADMIN — POTASSIUM CHLORIDE SCH MEQ: 1500 TABLET, EXTENDED RELEASE ORAL at 13:43

## 2019-07-21 RX ADMIN — ATORVASTATIN CALCIUM SCH MG: 20 TABLET, FILM COATED ORAL at 16:57

## 2019-07-21 RX ADMIN — DOCUSATE SODIUM SCH MG: 100 CAPSULE, LIQUID FILLED ORAL at 20:07

## 2019-07-21 RX ADMIN — LOSARTAN POTASSIUM SCH MG: 25 TABLET, FILM COATED ORAL at 09:37

## 2019-07-21 RX ADMIN — FLUTICASONE PROPIONATE SCH SPRAY: 50 SPRAY, METERED NASAL at 20:10

## 2019-07-21 RX ADMIN — SENNOSIDES SCH TAB: 8.6 TABLET, FILM COATED ORAL at 20:08

## 2019-07-21 RX ADMIN — OXYCODONE HYDROCHLORIDE AND ACETAMINOPHEN PRN TAB: 5; 325 TABLET ORAL at 20:08

## 2019-07-21 RX ADMIN — MAGNESIUM OXIDE TAB 400 MG (241.3 MG ELEMENTAL MG) SCH MG: 400 (241.3 MG) TAB at 09:36

## 2019-07-21 RX ADMIN — POTASSIUM CHLORIDE SCH MEQ: 1500 TABLET, EXTENDED RELEASE ORAL at 20:07

## 2019-07-21 RX ADMIN — AMLODIPINE BESYLATE SCH MG: 5 TABLET ORAL at 09:36

## 2019-07-21 RX ADMIN — POTASSIUM CHLORIDE SCH MEQ: 1500 TABLET, EXTENDED RELEASE ORAL at 09:38

## 2019-07-21 RX ADMIN — CHLORTHALIDONE SCH MG: 50 TABLET ORAL at 09:36

## 2019-07-21 RX ADMIN — OXYCODONE HYDROCHLORIDE AND ACETAMINOPHEN PRN TAB: 5; 325 TABLET ORAL at 09:41

## 2019-07-21 RX ADMIN — DOCUSATE SODIUM SCH MG: 100 CAPSULE, LIQUID FILLED ORAL at 09:37

## 2019-07-21 RX ADMIN — APIXABAN SCH MG: 2.5 TABLET, FILM COATED ORAL at 20:07

## 2019-07-21 NOTE — PN
Progress Note


Date of Service: 07/21/19


Note: 


SANTOS GARCIA was visited. Nursing notes read and reviewed. No chest pain, 

shortness of breath or abdominal pain.








Current Medications: 


 Active Medications











Generic Name Dose Route Start Last Admin





  Trade Name Freq  PRN Reason Stop Dose Admin


 


Acetaminophen  650 mg  07/18/19 11:58  





  Tylenol Tab*  PO   





  Q6H PRN   





  FEVER/PAIN   





     





     





     


 


Amlodipine Besylate  10 mg  07/19/19 09:00  07/20/19 08:11





  Norvasc Tab*  PO   10 mg





  DAILY ALEKS   Administration





     





     





     





     


 


Apixaban  2.5 mg  07/18/19 21:00  07/20/19 22:21





  Eliquis*  PO   2.5 mg





  BID ALEKS   Administration





     





     





     





     


 


Atorvastatin Calcium  20 mg  07/19/19 17:00  07/20/19 17:13





  Lipitor*  PO   20 mg





  1700 ALEKS   Administration





     





     





     





     


 


Bisacodyl  10 mg  07/18/19 11:58  





  Dulcolax Supp*  [place]   





  DAILY PRN   





  CONSTIPATION   





     





     





     


 


Calcium Carbonate  500 mg  07/18/19 12:21  07/19/19 07:19





  Tums*  PO   500 mg





  Q4H PRN   Administration





  DYSPEPSIA   





     





     





     


 


Chlorthalidone  25 mg  07/19/19 09:00  07/20/19 08:07





  Hygroton Tab*  PO   25 mg





  DAILY ALEKS   Administration





     





     





     





     


 


Docusate Sodium  100 mg  07/18/19 21:00  07/20/19 22:21





  Colace Cap*  PO   100 mg





  BID ALEKS   Administration





     





     





     





     


 


Fluticasone Propionate  2 spray  07/18/19 21:00  07/20/19 22:22





  Flonase Nasal Spray 50mcg*  BOTH NARES   2 spray





  2100 ALEKS   Administration





     





     





     





     


 


Lactulose  30 ml  07/18/19 17:45  07/18/19 21:34





  Lactulose*  PO   30 ml





  Q6H PRN   Administration





  CONSTIPATION   





     





     





     


 


Losartan Potassium  100 mg  07/19/19 09:00  07/20/19 08:11





  Cozaar Tab*  PO   100 mg





  DAILY ALEKS   Administration





     





     





     





     


 


Magnesium Oxide  800 mg  07/19/19 10:00  07/20/19 08:10





  Magox 400 Tab*  PO   800 mg





  DAILY ALEKS   Administration





     





     





     





     


 


Oxycodone/Acetaminophen  1 tab  07/18/19 12:09  07/20/19 22:21





  Percocet 5/325 Tab*  PO   1 tab





  Q4H PRN   Administration





  PAIN - MODERATE TO SEVERE   





     





     





     


 


Oxycodone/Acetaminophen  2 tab  07/18/19 12:15  07/20/19 08:06





  Percocet 5/325 Tab*  PO   2 tab





  Q4H PRN   Administration





  PAIN - SEVERE   





     





     





     


 


Potassium Chloride  20 meq  07/19/19 14:00  07/20/19 22:21





  Klor Con Er Tab*  PO   20 meq





  TID ALEKS   Administration





     





     





     





     


 


Senna  2 tab  07/18/19 21:00  07/20/19 22:21





  Senokot Tab*  PO   2 tab





  BEDTIME ALEKS   Administration





     





     





     





     


 


Solifenacin  10 mg  07/19/19 10:00  07/20/19 08:08





  Vesicare(Nf)  PO   10 mg





  DAILY ALEKS   Administration





     





     





     





     











Vital Signs: 


 Vital Signs











Temp Pulse Resp BP Pulse Ox


 


 98.2 F   90   24   153/69   97 


 


 07/21/19 06:05  07/21/19 06:05  07/21/19 06:05  07/21/19 06:05  07/21/19 06:05











Lab Results: 


 Laboratory Results - last 24 hr











  07/20/19





  06:03


 


WBC  10.4


 


RBC  4.59


 


Hgb  12.5


 


Hct  37


 


MCV  81


 


MCH  27


 


MCHC  34


 


RDW  16 H


 


Plt Count  202


 


MPV  8.5


 


Neut % (Auto)  70.0


 


Lymph % (Auto)  19.2


 


Mono % (Auto)  8.7


 


Eos % (Auto)  1.8


 


Baso % (Auto)  0.3


 


Absolute Neuts (auto)  7.3


 


Absolute Lymphs (auto)  2.0


 


Absolute Monos (auto)  0.9 H


 


Absolute Eos (auto)  0.2


 


Absolute Basos (auto)  0.0


 


Absolute Nucleated RBC  0.0


 


Nucleated RBC %  0.1











Exam: 





GEN: no acute distress. alert and appropriate.


LUNGS: clear to auscultation bilaterally.


CV: regular rate and rhythm


ABD: + bowel sounds, soft, non-tender, non-distended. Negative [place]'s.


EXT: No edema. Left knee incision with sutures c/d/i. Minimal proximal 

erythema.  Ecchymosis mostly medially.


NEURO: BLE motor 5/5 except pain limited testing of left hip and knee.  

Sensation intact BLE.








Assessment/Plan: 


67yo woman s/p left total knee replacement secondary to OA


#Left TKR: f/u with Dr. Gifford. Pain control. PT/OT.


#DVT ppx: Eliquis 2.5mg bid


#Hypertension: amlodipine, chlorthalidone, losartan


#Hypokalemia/Hypomagnesemia: K is better but Mg still low. continue supplements 

and recheck on Monday. MgOx may contribute to diarrhea.


#Obstructive sleep apnea: CPAP


#Hyperlipidemia: lipitor


#Leukocytosis: Resolved. UA not convincing for UTI. Not febrile and no other 

clear sign of infection.


#Hyperbilirubinemia: Not symptomatic. f/u Monday. 


#Overactive bladder: vesicare.


#Advanced Directives: full code


#Estimated LOS: 7/24/19 07/21/19 09:09

## 2019-07-22 LAB
ALBUMIN SERPL BCG-MCNC: 3.6 G/DL (ref 3.2–5.2)
ALBUMIN/GLOB SERPL: 1.2 {RATIO} (ref 1–3)
ALP SERPL-CCNC: 63 U/L (ref 34–104)
ALT SERPL W P-5'-P-CCNC: 11 U/L (ref 7–52)
ANION GAP SERPL CALC-SCNC: 8 MMOL/L (ref 2–11)
AST SERPL-CCNC: 10 U/L (ref 13–39)
BUN SERPL-MCNC: 13 MG/DL (ref 6–24)
BUN/CREAT SERPL: 26.5 (ref 8–20)
CALCIUM SERPL-MCNC: 9.2 MG/DL (ref 8.6–10.3)
CHLORIDE SERPL-SCNC: 102 MMOL/L (ref 101–111)
GLOBULIN SER CALC-MCNC: 3 G/DL (ref 2–4)
GLUCOSE SERPL-MCNC: 117 MG/DL (ref 70–100)
HCO3 SERPL-SCNC: 26 MMOL/L (ref 22–32)
MAGNESIUM SERPL-MCNC: 1.7 MG/DL (ref 1.9–2.7)
POTASSIUM SERPL-SCNC: 3.7 MMOL/L (ref 3.5–5)
PROT SERPL-MCNC: 6.6 G/DL (ref 6.4–8.9)
SODIUM SERPL-SCNC: 136 MMOL/L (ref 135–145)

## 2019-07-22 RX ADMIN — OXYCODONE HYDROCHLORIDE AND ACETAMINOPHEN PRN TAB: 5; 325 TABLET ORAL at 09:21

## 2019-07-22 RX ADMIN — DOCUSATE SODIUM SCH MG: 100 CAPSULE, LIQUID FILLED ORAL at 09:11

## 2019-07-22 RX ADMIN — OXYCODONE HYDROCHLORIDE AND ACETAMINOPHEN PRN TAB: 5; 325 TABLET ORAL at 21:29

## 2019-07-22 RX ADMIN — LOSARTAN POTASSIUM SCH MG: 25 TABLET, FILM COATED ORAL at 09:12

## 2019-07-22 RX ADMIN — SOLIFENACIN SUCCINATE SCH MG: 5 TABLET, FILM COATED ORAL at 09:12

## 2019-07-22 RX ADMIN — SENNOSIDES SCH TAB: 8.6 TABLET, FILM COATED ORAL at 21:28

## 2019-07-22 RX ADMIN — APIXABAN SCH MG: 2.5 TABLET, FILM COATED ORAL at 21:28

## 2019-07-22 RX ADMIN — POTASSIUM CHLORIDE SCH MEQ: 1500 TABLET, EXTENDED RELEASE ORAL at 13:33

## 2019-07-22 RX ADMIN — AMLODIPINE BESYLATE SCH MG: 5 TABLET ORAL at 09:11

## 2019-07-22 RX ADMIN — ACETAMINOPHEN PRN MG: 325 TABLET ORAL at 13:33

## 2019-07-22 RX ADMIN — CHLORTHALIDONE SCH MG: 50 TABLET ORAL at 09:11

## 2019-07-22 RX ADMIN — APIXABAN SCH MG: 2.5 TABLET, FILM COATED ORAL at 09:11

## 2019-07-22 RX ADMIN — POTASSIUM CHLORIDE SCH MEQ: 1500 TABLET, EXTENDED RELEASE ORAL at 09:12

## 2019-07-22 RX ADMIN — POTASSIUM CHLORIDE SCH MEQ: 1500 TABLET, EXTENDED RELEASE ORAL at 21:29

## 2019-07-22 RX ADMIN — ATORVASTATIN CALCIUM SCH MG: 20 TABLET, FILM COATED ORAL at 17:43

## 2019-07-22 RX ADMIN — DOCUSATE SODIUM SCH: 100 CAPSULE, LIQUID FILLED ORAL at 21:28

## 2019-07-22 RX ADMIN — MAGNESIUM OXIDE TAB 400 MG (241.3 MG ELEMENTAL MG) SCH MG: 400 (241.3 MG) TAB at 09:12

## 2019-07-22 RX ADMIN — FLUTICASONE PROPIONATE SCH SPRAY: 50 SPRAY, METERED NASAL at 21:29

## 2019-07-22 NOTE — PN
Progress Note


Date of Service: 07/22/19


Note: 


SANT[facility] GARCIA was visited. Therapy notes read and reviewed. She feels 

confident for Wednesday. Her K was 3.7 today and Mg was 1.7. Labs look better








Current Medications: 


 Active Medications











Generic Name Dose Route Start Last Admin





  Trade Name Freq  PRN Reason Stop Dose Admin


 


Acetaminophen  650 mg  07/18/19 11:58  07/22/19 13:33





  Tylenol Tab*  PO   650 mg





  Q6H PRN   Administration





  FEVER/PAIN   





     





     





     


 


Amlodipine Besylate  10 mg  07/19/19 09:00  07/22/19 09:11





  Norvasc Tab*  PO   10 mg





  DAILY ALEKS   Administration





     





     





     





     


 


Apixaban  2.5 mg  07/18/19 21:00  07/22/19 09:11





  Eliquis*  PO   2.5 mg





  BID ALEKS   Administration





     





     





     





     


 


Atorvastatin Calcium  20 mg  07/19/19 17:00  07/22/19 17:43





  Lipitor*  PO   20 mg





  1700 ALEKS   Administration





     





     





     





     


 


Bisacodyl  10 mg  07/18/19 11:58  





  Dulcolax Supp*  [place]   





  DAILY PRN   





  CONSTIPATION   





     





     





     


 


Calcium Carbonate  500 mg  07/18/19 12:21  07/19/19 07:19





  Tums*  PO   500 mg





  Q4H PRN   Administration





  DYSPEPSIA   





     





     





     


 


Chlorthalidone  25 mg  07/19/19 09:00  07/22/19 09:11





  Hygroton Tab*  PO   25 mg





  DAILY ALEKS   Administration





     





     





     





     


 


Docusate Sodium  100 mg  07/18/19 21:00  07/22/19 09:11





  Colace Cap*  PO   100 mg





  BID ALEKS   Administration





     





     





     





     


 


Fluticasone Propionate  2 spray  07/18/19 21:00  07/21/19 20:10





  Flonase Nasal Spray 50mcg*  BOTH NARES   2 spray





  2100 ALEKS   Administration





     





     





     





     


 


Lactulose  30 ml  07/18/19 17:45  07/18/19 21:34





  Lactulose*  PO   30 ml





  Q6H PRN   Administration





  CONSTIPATION   





     





     





     


 


Losartan Potassium  100 mg  07/19/19 09:00  07/22/19 09:12





  Cozaar Tab*  PO   100 mg





  DAILY ALEKS   Administration





     





     





     





     


 


Magnesium Oxide  400 mg  07/23/19 09:00  





  Magox 400 Tab*  PO   





  DAILY ALEKS   





     





     





     





     


 


Oxycodone/Acetaminophen  1 tab  07/18/19 12:09  07/21/19 20:08





  Percocet 5/325 Tab*  PO   1 tab





  Q4H PRN   Administration





  PAIN - MODERATE TO SEVERE   





     





     





     


 


Oxycodone/Acetaminophen  2 tab  07/18/19 12:15  07/22/19 09:21





  Percocet 5/325 Tab*  PO   2 tab





  Q4H PRN   Administration





  PAIN - SEVERE   





     





     





     


 


Potassium Chloride  20 meq  07/22/19 21:00  





  Klor Con Er Tab*  PO   





  BID ALEKS   





     





     





     





     


 


Senna  2 tab  07/18/19 21:00  07/21/19 20:08





  Senokot Tab*  PO   2 tab





  BEDTIME ALEKS   Administration





     





     





     





     


 


Solifenacin  10 mg  07/19/19 10:00  07/22/19 09:12





  Vesicare(Nf)  PO   10 mg





  DAILY ALEKS   Administration





     





     





     





     











Vital Signs: 


 Vital Signs











Temp Pulse Resp BP Pulse Ox


 


 97.8 F   95   20   152/66   98 


 


 07/22/19 16:20  07/22/19 16:20  07/22/19 18:40  07/22/19 16:20  07/22/19 18:40











Lab Results: 


 Laboratory Results - last 24 hr











  07/22/19





  05:09


 


Sodium  136


 


Potassium  3.7


 


Chloride  102


 


Carbon Dioxide  26


 


Anion Gap  8


 


BUN  13


 


Creatinine  0.49 L


 


Est GFR ( Amer)  151.5


 


Est GFR (Non-Af Amer)  125.2


 


BUN/Creatinine Ratio  26.5 H


 


Glucose  117 H


 


Calcium  9.2


 


Magnesium  1.7 L


 


Total Bilirubin  0.90


 


AST  10 L


 


ALT  11


 


Alkaline Phosphatase  63


 


Total Protein  6.6


 


Albumin  3.6


 


Globulin  3.0


 


Albumin/Globulin Ratio  1.2











Exam: 








GENERAL: no acute distress. alert and appropriate.


LUNGS: clear to auscultation bilaterally.


HEART: regular rate and rhythm


ABDOMEN: + bowel sounds, soft, non-tender, non-distended. 


EXTREMITIES: No edema. Left knee incision with sutures c/d/i. Minimal proximal 

erythema.  Ecchymosis mostly medially.


NEUROLOGIC: BLE motor 5/5 except pain limited testing of left hip and knee.  

Sensation intact BLE.


Assessment/Plan: 


67yo woman s/p left total knee replacement secondary to OA


#Left TKR: f/u with Dr. Gifford. Pain control. PT/OT.


#DVT ppx: Eliquis 2.5mg bid


#Hypertension: amlodipine, chlorthalidone, losartan


#Hypokalemia/Hypomagnesemia: K is better but Mg still low. continue supplements 

and recheck on Monday. MgOx may contribute to diarrhea.


#Obstructive sleep apnea: CPAP


#Hyperlipidemia: lipitor


#Leukocytosis: Resolved. UA not convincing for UTI. Not febrile and no other 

clear sign of infection.


#Hyperbilirubinemia: Not symptomatic. f/u Monday. 


#Overactive bladder: vesicare.


#Advanced Directives: full code


#Estimated LOS: 7/24/19 07/21/19 09:09

## 2019-07-23 RX ADMIN — APIXABAN SCH MG: 2.5 TABLET, FILM COATED ORAL at 20:59

## 2019-07-23 RX ADMIN — LOSARTAN POTASSIUM SCH MG: 25 TABLET, FILM COATED ORAL at 09:27

## 2019-07-23 RX ADMIN — DOCUSATE SODIUM SCH: 100 CAPSULE, LIQUID FILLED ORAL at 09:33

## 2019-07-23 RX ADMIN — CHLORTHALIDONE SCH MG: 50 TABLET ORAL at 09:27

## 2019-07-23 RX ADMIN — ACETAMINOPHEN PRN MG: 325 TABLET ORAL at 20:59

## 2019-07-23 RX ADMIN — AMLODIPINE BESYLATE SCH MG: 5 TABLET ORAL at 09:26

## 2019-07-23 RX ADMIN — POTASSIUM CHLORIDE SCH MEQ: 1500 TABLET, EXTENDED RELEASE ORAL at 09:27

## 2019-07-23 RX ADMIN — DOCUSATE SODIUM SCH MG: 100 CAPSULE, LIQUID FILLED ORAL at 20:59

## 2019-07-23 RX ADMIN — APIXABAN SCH MG: 2.5 TABLET, FILM COATED ORAL at 09:26

## 2019-07-23 RX ADMIN — MAGNESIUM OXIDE TAB 400 MG (241.3 MG ELEMENTAL MG) SCH MG: 400 (241.3 MG) TAB at 09:27

## 2019-07-23 RX ADMIN — ACETAMINOPHEN PRN MG: 325 TABLET ORAL at 14:05

## 2019-07-23 RX ADMIN — SOLIFENACIN SUCCINATE SCH MG: 5 TABLET, FILM COATED ORAL at 09:28

## 2019-07-23 RX ADMIN — SENNOSIDES SCH TAB: 8.6 TABLET, FILM COATED ORAL at 20:59

## 2019-07-23 RX ADMIN — ATORVASTATIN CALCIUM SCH MG: 20 TABLET, FILM COATED ORAL at 17:58

## 2019-07-23 RX ADMIN — FLUTICASONE PROPIONATE SCH SPRAY: 50 SPRAY, METERED NASAL at 20:59

## 2019-07-23 RX ADMIN — OXYCODONE HYDROCHLORIDE AND ACETAMINOPHEN PRN TAB: 5; 325 TABLET ORAL at 09:36

## 2019-07-23 NOTE — PN
Progress Note


Date of Service: 07/23/19


Note: 


SANT[facility] GARCIA was visited. Therapy notes read and reviewed. She was 

discussed in interdisciplinary team rounds. She is doing well. For discharge 

tomorrow. Knee looks clean. 








Current Medications: 


 Active Medications











Generic Name Dose Route Start Last Admin





  Trade Name Freq  PRN Reason Stop Dose Admin


 


Acetaminophen  650 mg  07/18/19 11:58  07/23/19 14:05





  Tylenol Tab*  PO   650 mg





  Q6H PRN   Administration





  FEVER/PAIN   





     





     





     


 


Amlodipine Besylate  10 mg  07/19/19 09:00  07/23/19 09:26





  Norvasc Tab*  PO   10 mg





  DAILY ALEKS   Administration





     





     





     





     


 


Apixaban  2.5 mg  07/18/19 21:00  07/23/19 09:26





  Eliquis*  PO   2.5 mg





  BID ALEKS   Administration





     





     





     





     


 


Atorvastatin Calcium  20 mg  07/19/19 17:00  07/22/19 17:43





  Lipitor*  PO   20 mg





  1700 ALEKS   Administration





     





     





     





     


 


Bisacodyl  10 mg  07/18/19 11:58  





  Dulcolax Supp*  [place]   





  DAILY PRN   





  CONSTIPATION   





     





     





     


 


Calcium Carbonate  500 mg  07/18/19 12:21  07/19/19 07:19





  Tums*  PO   500 mg





  Q4H PRN   Administration





  DYSPEPSIA   





     





     





     


 


Chlorthalidone  25 mg  07/19/19 09:00  07/23/19 09:27





  Hygroton Tab*  PO   25 mg





  DAILY ALEKS   Administration





     





     





     





     


 


Docusate Sodium  100 mg  07/18/19 21:00  07/23/19 09:33





  Colace Cap*  PO   Not Given





  BID ALEKS   





     





     





     





     


 


Fluticasone Propionate  2 spray  07/18/19 21:00  07/22/19 21:29





  Flonase Nasal Spray 50mcg*  BOTH NARES   2 spray





  2100 ALEKS   Administration





     





     





     





     


 


Lactulose  30 ml  07/18/19 17:45  07/18/19 21:34





  Lactulose*  PO   30 ml





  Q6H PRN   Administration





  CONSTIPATION   





     





     





     


 


Losartan Potassium  100 mg  07/19/19 09:00  07/23/19 09:27





  Cozaar Tab*  PO   100 mg





  DAILY ALEKS   Administration





     





     





     





     


 


Magnesium Oxide  400 mg  07/23/19 09:00  07/23/19 09:27





  Magox 400 Tab*  PO   400 mg





  DAILY ALEKS   Administration





     





     





     





     


 


Oxycodone/Acetaminophen  1 tab  07/18/19 12:09  07/23/19 09:36





  Percocet 5/325 Tab*  PO   1 tab





  Q4H PRN   Administration





  PAIN - MODERATE TO SEVERE   





     





     





     


 


Oxycodone/Acetaminophen  2 tab  07/18/19 12:15  07/22/19 09:21





  Percocet 5/325 Tab*  PO   2 tab





  Q4H PRN   Administration





  PAIN - SEVERE   





     





     





     


 


Potassium Chloride  20 meq  07/24/19 09:00  





  Klor Con Er Tab*  PO   





  DAILY ALEKS   





     





     





     





     


 


Senna  2 tab  07/18/19 21:00  07/22/19 21:28





  Senokot Tab*  PO   2 tab





  BEDTIME ALEKS   Administration





     





     





     





     


 


Solifenacin  10 mg  07/19/19 10:00  07/23/19 09:28





  Vesicare(Nf)  PO   10 mg





  DAILY ALEKS   Administration





     





     





     





     











Vital Signs: 


 Vital Signs











Temp Pulse Resp BP Pulse Ox


 


 97.9 F   100   18   175/65   98 


 


 07/23/19 16:07  07/23/19 16:07  07/23/19 16:07  07/23/19 16:07  07/23/19 16:07











Exam: 





GENERAL: no acute distress. alert and appropriate.


LUNGS: clear to auscultation bilaterally.


HEART: regular rate and rhythm


ABDOMEN: + bowel sounds, soft, non-tender, non-distended. 


EXTREMITIES: No edema. Left knee incision with sutures c/d/i. Minimal proximal 

erythema.  Ecchymosis mostly medially.


NEUROLOGIC: BLE motor 5/5 except pain limited testing of left hip and knee.  

Can do a SLR. Sensation intact BLE.


Assessment/Plan: 


67yo woman s/p left total knee replacement secondary to OA


1. Left TKR: f/u with Dr. Gifford. Pain control. PT/OT.


2. DVT ppx: Eliquis 2.5mg bid


3. Hypertension: amlodipine, chlorthalidone, losartan


4. Hypokalemia/Hypomagnesemia: Supplements, check labs in am. K has normalized


5. Obstructive sleep apnea: CPAP


6. Hyperlipidemia: lipitor


7. Overactive bladder: vesicare.


8. Advanced Directives: full code


9. Estimated LOS: d/c 7/24/19 07/23/19 17:39





07/23/19 17:42

## 2019-07-23 NOTE — PMRUTEAM
PMRU: Team Meeting


Current Status: 


 Nursing: Current Status











Skin Deviations [Left Knee]    Incision


 


Skin Deviation Description [   cryounit in place





Left Knee]                     











 Physical Therapy: Current Status











Bed Mobility Assistance        Independent


 


Transfer Mobility Assistance   Independent


 


Transfer/Bed Mobility          Rolling Walker





Recommended Devices            


 


Ambulation Assistance          Independent


 


Ambulation Assistive Devices   Rolling Walker


 


Number of Feet Patient         1x150', 1x250'





Ambulated                      


 


Stairs Assistance              Independent


 


Stairs Recommended Devices     Two Rails


 


Number of Stairs               3x5 (15)


 


Curb                           Independent


 


Curb Assistive Devices         Rolling Walker


 


Objective Comments             step to pattern ascend/descending stairs














 Occupational Therapy: Current Status











Upper Body Dressing            Independent


 


Lower Body Dressing            Ind with Adaptive Equip


 


Bathing                        Ind with Adaptive Equip


 


Toileting                      Ind with Adaptive Equip,Min Assist


 


Toileting Progress             pt has toilet wand at home


 


Toilet Transfer                Ind with Adaptive Equip


 


Shower Transfer                Ind with Adaptive Equip


 


Eating                         Independent














 Rec Therapy: Current Status











Summary of Assessment and      Recreation Therapy assessment complete and pt. is





Clinical Impression            aware of services.  Pt. has been very active in





 leisure visits, pet therapy, reading independently





 and visiting with friends.


 


Treatment Goals                Pt. will engage in leisure activities while on 

the





 unit.


 


Treatment Plan                 Provide recreation therapy and encourage active





 involvement.














 Social Work: Current Status











Discharge Plan                 return home with home care svs and support from





 friends


 


Potential for Family Training  n/a pt lives alone


 


Anticipated Discharge          Home





Destination                    


 


Discharge With                 home care svs and support from friends














 Nutrition: Current Status











Monitoring                     full assessment planned 7/25, unless pt d/c'd.





 She is eating well w/regular diet.  Hx morbid





 obesity, but has no acute nutritional concerns at





 this time.














Goals: 


 Physical Therapy: Initial Goals











Bed Mobility Assistance        Independent


 


Transfer Mobility Assistance   Independent


 


Transfer/Bed Mobility          Rolling Walker





Recommended Devices            


 


Ambulation                     Independent


 


Ambulation Recommended Devices Rolling Walker


 


Ambulation Distance            150


 


Stairs Assistance              Independent


 


Stair Recommended Devices      Two Rails


 


Number of Stairs               5


 


Home Exercise Program          Independent





Assistance                     














 Physical Therapy: Updated Goals











Transfer/Bed Mobility          Rolling Walker





Recommended Devices            














 Occupational Therapy: Initial Goals











Goals to be Completed in (Days 5-7





)                              


 


Upper Body Bathing Routine     Modified Independent with


 


Lower Body Bathing Routine     Modified Independent with


 


Upper Body Dressing Routine    Modified Independent with


 


Lower Body Dressing Routine    Modified Independent with


 


Toilet Hygeine and Clothing    Modified Independent with





Management Routine             


 


Toilet Transfer Routine        Modified Independent with


 


Tub Transfer Routine           Modified Independent with


 


Tub Trasnfer Assistive Devices +TTB


 


Functional Transfers for ADL   Modified Independent with


 


Grooming Routine               Independent,Modified Independent with


 


Feeding Routine                Independent


 


Light Housekeeping Tasks       Minimal Contact Assist














 Nutrition: Goals











Intervention Goals             TBD











 Social Work: Goals











Discharge Plan                 return home with home care svs and support from





 friends


 


Potential for Family Training  n/a pt lives alone


 


Anticipated Discharge          Home





Destination                    


 


Discharge With                 home care svs and support from friends

















Care Plan: 


 Care Plan





ADL's - Improve/Maintain                Start:  07/18/19 14:12              


Freq:   DAILY                           Status: Active      Target:         


Protocol:                                                                   








Activity Type Activity Date Activity User E-Sign Co-Sign Detail





Recorded Client Recorded Date Recorded By   


 


Document 07/22/19 14:40 [id number]   





PMRU-C09 07/22/19 14:40 [id number]   














  07/22/19





  14:40


 


PMRU Outcome: ADL's/ADL Transfers 


 


Orders/Interventions Occupational





 Therapy





 Evaluation &





 Treatment





 Communication





 Tool in Patient





 Room


 


Device Yes


 


Address Deficits Secondary To: L TKA


 


Patient to receive OT 5x/wk for  Therex





min/day Self Care





 Management





 Group Therapy


 


UE/LE ADL's with Assist Yes: Du


 


ADL Transfers with Assist Yes: Du


 


Toileting: Transfers,Clothing Management Yes: Du





,Hygeine w/Assist 


 


Light Kitchen/Laundry w/Assist Yes: light meal





 prep Du


 


Progression Toward Outcome/Goals Progressing


 


Outcome/Goals Met Pt is making





 progress in





 endurance, she





 was able to





 walk to sink to





 brush teeth,





 and to and from





 shower room





 using FWW.








DVT Prophylaxis- Improve/Maintain       Start:  07/19/19 02:15              


Freq:   QSHIFT                          Status: Active      Target:         


Protocol:                                                                   








Activity Type Activity Date Activity User E-Sign Co-Sign Detail





Recorded Client Recorded Date Recorded By   


 


Document 07/22/19 18:34 [id number]   





PMRU-C03 07/22/19 18:34 [id number]   














  07/22/19





  18:34


 


PMRU Outcome: DVT Prophylaxis 


 


Outcome/Goals Remains Free of





 DVT





 Complies with





 DVT Prophylaxis





 /Treatment





 Demonstrates





 Knowledge of





 DVT Prevention/





 Treatment





 TEDS Stockings





 on Every AM,





 Off at HS


 


Progression Toward Outcome/Goals Progressing








Discharge Planning - Improve/Maintain   Start:  07/19/19 02:15              


Freq:   DAILY                           Status: Active      Target:         


Protocol:                                                                   








Activity Type Activity Date Activity User E-Sign Co-Sign Detail





Recorded Client Recorded Date Recorded By   


 


Document 07/23/19 00:42 [id number]   





PMRU-C07 07/23/19 00:42 [id number]   














  07/23/19





  00:42


 


PMRU Outcome: Discharge Planning 


 


Update Patient Family No


 


Outcome/Goals Demonstrates





 Understanding





 of Discharge





 Plan


 


Progression Toward Outcome/Goals Progressing








Education-Improve/Maintain              Start:  07/19/19 02:15              


Freq:   QSHIFT                          Status: Active      Target:         


Protocol:                                                                   








Activity Type Activity Date Activity User E-Sign Co-Sign Detail





Recorded Client Recorded Date Recorded By   


 


Document 07/22/19 18:34 [id number]   





[facility]-C03 07/22/19 18:34 [id number]   














  07/22/19





  18:34


 


PMRU Outcome: Education 


 


Outcome/Goals Demonstrate/





 Verbalize





 Understanding





 of Written





 Discharge





 Instructions





 Demonstrates





 Skills





 Encourage





 Questions


 


Progression Toward Outcome/Goals Progressing








/GI-Improve/Maintain                  Start:  07/19/19 02:15              


Freq:   QSHIFT                          Status: Active      Target:         


Protocol:                                                                   








Activity Type Activity Date Activity User E-Sign Co-Sign Detail





Recorded Client Recorded Date Recorded By   


 


Document 07/22/19 18:34 [id number]   





[facility]-C03 07/22/19 18:34 [id number]   














  07/22/19





  18:34


 


PMRU Outcome: Genitourinary/ 





Gastrointestinal 


 


Genitourinary- Outcome/Goals Maintain/





 Achieve





 Adequate





 Urinary Output





 Remain Free of





 Hospital-





 Acquired UTI


 


Gastrointestinal-Outcome/Goals Maintain/





 Achieve Bowel





 Regularity in





 Accordance with





 Pt's Baseline





 Prevent





 Constipation


 


Progression Toward Outcome/Goals -  Progressing


 


Progression Toward Outcome/Goals - GI Progressing


 


Outcome/Goals Met Comment pt up to BR








Medication Administration               Start:  07/19/19 02:15              


Freq:   QSHIFT                          Status: Active      Target:         


Protocol:                                                                   








Activity Type Activity Date Activity User E-Sign Co-Sign Detail





Recorded Client Recorded Date Recorded By   


 


Document 07/22/19 18:34 [id number]   





[facility]-C03 07/22/19 18:34 [id number]   














  07/22/19





  18:34


 


PMRU Outcome: Medication Administration 


 


Assess Patient Knowledge/Teach Med No





Education for all Meds 


 


Outcome/Goals Patient





 Independent





 with Medication





 Administration





 at Home





 Demonstrates





 Understanding


 


Progression Towards Outcome/Goals Progressing


 


Is Patient Going Home on Lovenox? No








Mobility- Improve/Maintain              Start:  07/19/19 02:15              


Freq:   DAILY@10                        Status: Active      Target:         


Protocol:                                                                   








Activity Type Activity Date Activity User E-Sign Co-Sign Detail





Recorded Client Recorded Date Recorded By   


 


Document 07/23/19 09:24 [id number]   





PMRU-M12 07/23/19 09:27 [id number]   














  07/23/19





  09:24


 


PMRU Outcome: Mobility 


 


Physical Therapy Evaluation and Yes





Treatment 


 


Activity OOB with Assistance Yes


 


WBAT Yes: LLE


 


Device Yes: FWW


 


Assistance Yes: CGA


 


Patient to be seen 5x/wk for  min/ Therex





day for: Mobility





 Training





 Gait Training





 Balance


 


Outcome/Goals Maintain/





 Achieve





 Baseline





 Mobility Status





 Improve





 Mobility Status





 Demonstrates





 Proper Use of





 Assistive





 Devices





 Free from





 Complications





 of Immobility


 


Progression Toward Outcome/Goals Progressing


 


Outcome/Goals Met Improve





 Mobility Status





 Demonstrates





 Proper Use of





 Assistive





 Devices





 Free from





 Complications





 of Immobility


 


Bed Mobility Yes: Ind


 


Transfers Yes: Mod I with





 walker


 


Gait x ft Yes: Mod I with





 walker x150ft


 


W/C Mobility x ft No


 


Up/Down Stairs Yes: Mod I x





 5steps, 2 rails


 


With HEP Yes: Ind














Medicine Note: 








Length of Stay:  1 day





Anticipated Discharge Destination: Home





Tentative Discharge Date:  07/24/19





Discharged to:  Home

## 2019-07-24 VITALS — DIASTOLIC BLOOD PRESSURE: 60 MMHG | SYSTOLIC BLOOD PRESSURE: 154 MMHG

## 2019-07-24 LAB
ALBUMIN SERPL BCG-MCNC: 4 G/DL (ref 3.2–5.2)
ALBUMIN/GLOB SERPL: 1.3 {RATIO} (ref 1–3)
ALP SERPL-CCNC: 74 U/L (ref 34–104)
ALT SERPL W P-5'-P-CCNC: 10 U/L (ref 7–52)
ANION GAP SERPL CALC-SCNC: 11 MMOL/L (ref 2–11)
AST SERPL-CCNC: (no result) U/L (ref 13–39)
AST SERPL-CCNC: 11 U/L (ref 13–39)
BUN SERPL-MCNC: 15 MG/DL (ref 6–24)
BUN/CREAT SERPL: 30 (ref 8–20)
CALCIUM SERPL-MCNC: 9.5 MG/DL (ref 8.6–10.3)
CHLORIDE SERPL-SCNC: 99 MMOL/L (ref 101–111)
GLOBULIN SER CALC-MCNC: 3.2 G/DL (ref 2–4)
GLUCOSE SERPL-MCNC: 108 MG/DL (ref 70–100)
HCO3 SERPL-SCNC: 23 MMOL/L (ref 22–32)
POTASSIUM SERPL-SCNC: (no result) MMOL/L (ref 3.5–5)
POTASSIUM SERPL-SCNC: 3.3 MMOL/L (ref 3.5–5)
PROT SERPL-MCNC: 7.2 G/DL (ref 6.4–8.9)
SODIUM SERPL-SCNC: 133 MMOL/L (ref 135–145)

## 2019-07-24 RX ADMIN — SOLIFENACIN SUCCINATE SCH MG: 5 TABLET, FILM COATED ORAL at 08:41

## 2019-07-24 RX ADMIN — APIXABAN SCH MG: 2.5 TABLET, FILM COATED ORAL at 08:35

## 2019-07-24 RX ADMIN — CHLORTHALIDONE SCH MG: 50 TABLET ORAL at 08:36

## 2019-07-24 RX ADMIN — AMLODIPINE BESYLATE SCH MG: 5 TABLET ORAL at 08:31

## 2019-07-24 RX ADMIN — MAGNESIUM OXIDE TAB 400 MG (241.3 MG ELEMENTAL MG) SCH MG: 400 (241.3 MG) TAB at 08:37

## 2019-07-24 RX ADMIN — DOCUSATE SODIUM SCH MG: 100 CAPSULE, LIQUID FILLED ORAL at 08:37

## 2019-07-24 RX ADMIN — OXYCODONE HYDROCHLORIDE AND ACETAMINOPHEN PRN TAB: 5; 325 TABLET ORAL at 08:54

## 2019-07-24 RX ADMIN — LOSARTAN POTASSIUM SCH MG: 25 TABLET, FILM COATED ORAL at 08:38

## 2019-07-24 NOTE — DS
CC:  Dr. Juanita Smith *

 

DISCHARGE SUMMARY:

 

DATE OF ADMISSION:  07/18/19

 

DATE OF DISCHARGE:  07/24/19

 

DISCHARGE DIAGNOSES:

1.  Left total knee replacement.

2.  Hypertension.

3.  Hypokalemia.

4.  Sleep apnea.

5.  Overactive bladder.

 

HISTORY OF ILLNESS AND HOSPITAL COURSE:  For complete history of the events 
leading up to her rehab stay, please see the history and physical dictated by 
me on 07/18/19.  While on the rehab unit, the patient received adequate 
analgesia with oral analgesics.  She was maintained on Eliquis for DVT 
prophylaxis.  She was noted to be hypokalemic and hypomagnesemic and was given 
supplements for this.  The patient otherwise was stable from a medical point of 
view.  She was seen by Physical Therapy and Occupational Therapy and made good 
gains from both disciplines.  With physical therapy at the time of admission, 
the patient required contact guard for transfers, she was able to ambulate 100 
feet with contact guard.  With occupational therapy at the time of admission, 
the patient was requiring supervision for upper body dressing, min assist for 
lower body dressing, min assist for bathing, min assist for toileting, and 
contact guard for toilet transfers.  By the time of discharge, the patient was 
independent in her activities of daily living, independent, ambulating 300 feet 
with a rolling walker, independent going up and down a flight of stairs.  The 
patient was discharged home on 07/24/19. Discharge diet was regular.

 

DISCHARGE MEDICATIONS:

1.  Eliquis 2.5 mg twice daily.

2.  Lipitor 20 mg daily.

3.  Hygroton 25 mg daily.

4.  Losartan 100 mg daily.

5.  Percocet 5/325 one to two tablets every 4 hours as needed.

6.  VESIcare 10 mg daily.

7.  Potassium 99 mg orally daily.

8.  Norvasc 10 mg every morning.

 

SERVICES AFTER DISCHARGE:  Through [facility].  She will 
have home nursing, home physical therapy.  Follow up with Dr. Eve Gifford on 
Monday, 07/29.

 

CONDITION AT DISCHARGE:  The patient was discharged home in stable condition.

 

TIME SPENT:  Time for this discharge was approximately 50 minutes, greater than 
half of that was spent with the patient explaining her post-discharge therapies
, followup, and medications.

 

 [id number]/CPS #: [id number]

[facility]

## 2022-09-27 NOTE — HP
ADMISSION HISTORY AND PHYSICAL:

 

DATE OF ADMISSION:  07/18/19

 

REASON FOR ADMISSION:  Left total knee replacement.

 

HISTORY OF PRESENT ILLNESS:  Caryn Garnett is a 68-year-old female.  She has a 
medical history significant for hypertension and hyperlipidemia as well as 
sleep apnea and obesity.  In addition, she had a hysterectomy done this past 
spring.  She has had difficulty with her left knee for many years.  The patient 
originally was going to have her left knee replaced 6 years ago, but her 
 was diagnosed with a glioblastoma.  She put off the surgery so she 
could take care of her , who is now since passed away.  The patient had 
tried and failed conservative treatment including injections into the knee and 
physical therapy. 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  She had 
sought out consultation with Dr. Gifford.  She was admitted to [facility] on 07/16/19 and underwent a total knee replacement that day.  
Postoperatively, she had lot of difficulty with pain and mobilization.  She is 
now being admitted for inpatient rehab so that she might return to independent 
living.

 

PAST MEDICAL HISTORY:  Significant for the aforementioned sleep apnea, 
hypertension, hypercholesterolemia.

 

PAST SURGICAL HISTORY:  As mentioned previously, she had a history of a 
hysterectomy, which was done 12/06/18.

 

CURRENT MEDICATIONS:  Include:

1.  Norvasc.

2.  Eliquis for DVT prophylaxis.

3.  She is on Lipitor.

4.  Chlorthalidone.

5.  Flonase.

6.  Cozaar.

7.  Percocet for pain control.

 

ALLERGIES:  The patient has no known drug allergies.

 

SOCIAL HISTORY:  She is a nonsmoker, nondrinker.  She lives alone in a 1-story 
house.  She has no children.

 

REVIEW OF SYSTEMS:  The patient reports no current shortness of breath or chest 
pain.

 

                               PHYSICAL EXAMINATION

 

VITAL SIGNS:  The patient's temperature is 98.4, blood pressure is 130/50, 
pulse 95, respirations 20.

 

HEENT:  Her extraocular movements are intact.  Tongue is midline.

 

NECK:  Supple.

 

LUNGS:  Sound clear to auscultation bilaterally.

 

HEART:  Sounds were regular.  S1 and S2 were audible.

 

ABDOMEN:  Soft and nontender.

 

EXTREMITIES:  Her left knee has a wound, which is clean and dry.  There is some 
area of ecchymosis surrounding the knee.

 

NEUROLOGIC:  Sensation was intact.  Muscle strength in the left leg was about 3/
5 secondary to pain.  She can dorsiflex with about 4/5 strength.  Her right leg 
and upper extremities were 5/5.

 

FUNCTIONAL EXAM:  She transfers with minimal assistance.

 

 ASSESSMENT:  Status post left total knee replacement.

 

PLAN:  Integrate her into a comprehensive and therapeutic rehab program with 
the following goals:

1.  Physical Therapy will work with the patient.  They are going to work on 
functional transfer training, ambulation training with a walker.

2.  Occupational Therapy will see the patient, work on her activities of daily 
living including toileting and toilet transfers.

3.  Eliquis for DVT prophylaxis.

4.  Adequate analgesia.

5.  Her bowels will be regulated.  She does report she is constipated.

6.   will be closely involved to make sure that any services and 
equipment the patient requires are in place prior to discharge.

7.  Family training as appropriate. 8.  Home with appropriate services.

9.  Advance directives:  The patient is a full code.

 

ESTIMATED LENGTH OF STAY:  7 to 10 days.

 

 

 

[id number]/CPS #: [id number]

MTDD 167.9